# Patient Record
Sex: FEMALE | Race: WHITE | NOT HISPANIC OR LATINO | Employment: UNEMPLOYED | URBAN - METROPOLITAN AREA
[De-identification: names, ages, dates, MRNs, and addresses within clinical notes are randomized per-mention and may not be internally consistent; named-entity substitution may affect disease eponyms.]

---

## 2017-06-08 ENCOUNTER — HOSPITAL ENCOUNTER (EMERGENCY)
Facility: HOSPITAL | Age: 47
Discharge: HOME | End: 2017-06-08
Attending: EMERGENCY MEDICINE | Admitting: EMERGENCY MEDICINE

## 2017-06-08 VITALS
HEART RATE: 86 BPM | RESPIRATION RATE: 18 BRPM | TEMPERATURE: 98 F | WEIGHT: 215 LBS | BODY MASS INDEX: 40.59 KG/M2 | OXYGEN SATURATION: 97 % | DIASTOLIC BLOOD PRESSURE: 69 MMHG | HEIGHT: 61 IN | SYSTOLIC BLOOD PRESSURE: 138 MMHG

## 2017-06-08 DIAGNOSIS — Z76.0 MEDICATION REFILL: ICD-10-CM

## 2017-06-08 DIAGNOSIS — K29.00 ACUTE GASTRITIS WITHOUT HEMORRHAGE, UNSPECIFIED GASTRITIS TYPE: Primary | ICD-10-CM

## 2017-06-08 LAB
ANION GAP SERPL CALC-SCNC: 8 MMOL/L
B-HCG UR QL: NEGATIVE
BASOPHILS # BLD AUTO: 0.03 K/UL
BASOPHILS NFR BLD: 0.4 %
BUN SERPL-MCNC: 11 MG/DL
CALCIUM SERPL-MCNC: 9.1 MG/DL
CHLORIDE SERPL-SCNC: 101 MMOL/L
CO2 SERPL-SCNC: 27 MMOL/L
CREAT SERPL-MCNC: 0.8 MG/DL
CTP QC/QA: YES
DIFFERENTIAL METHOD: ABNORMAL
EOSINOPHIL # BLD AUTO: 0.1 K/UL
EOSINOPHIL NFR BLD: 0.8 %
ERYTHROCYTE [DISTWIDTH] IN BLOOD BY AUTOMATED COUNT: 12.5 %
EST. GFR  (AFRICAN AMERICAN): >60 ML/MIN/1.73 M^2
EST. GFR  (NON AFRICAN AMERICAN): >60 ML/MIN/1.73 M^2
GLUCOSE SERPL-MCNC: 113 MG/DL
HCT VFR BLD AUTO: 42.1 %
HGB BLD-MCNC: 14.4 G/DL
LYMPHOCYTES # BLD AUTO: 1.1 K/UL
LYMPHOCYTES NFR BLD: 14.9 %
MCH RBC QN AUTO: 32.1 PG
MCHC RBC AUTO-ENTMCNC: 34.2 %
MCV RBC AUTO: 94 FL
MONOCYTES # BLD AUTO: 0.5 K/UL
MONOCYTES NFR BLD: 7.3 %
NEUTROPHILS # BLD AUTO: 5.4 K/UL
NEUTROPHILS NFR BLD: 76.2 %
PLATELET # BLD AUTO: 264 K/UL
PMV BLD AUTO: 10.4 FL
POTASSIUM SERPL-SCNC: 4.1 MMOL/L
RBC # BLD AUTO: 4.49 M/UL
SODIUM SERPL-SCNC: 136 MMOL/L
WBC # BLD AUTO: 7.12 K/UL

## 2017-06-08 PROCEDURE — 99285 EMERGENCY DEPT VISIT HI MDM: CPT | Mod: ,,,

## 2017-06-08 PROCEDURE — 81025 URINE PREGNANCY TEST: CPT

## 2017-06-08 PROCEDURE — 96361 HYDRATE IV INFUSION ADD-ON: CPT

## 2017-06-08 PROCEDURE — 63600175 PHARM REV CODE 636 W HCPCS

## 2017-06-08 PROCEDURE — 96375 TX/PRO/DX INJ NEW DRUG ADDON: CPT

## 2017-06-08 PROCEDURE — 93010 ELECTROCARDIOGRAM REPORT: CPT | Mod: S$GLB,,, | Performed by: INTERNAL MEDICINE

## 2017-06-08 PROCEDURE — 93005 ELECTROCARDIOGRAM TRACING: CPT

## 2017-06-08 PROCEDURE — 99284 EMERGENCY DEPT VISIT MOD MDM: CPT | Mod: 25

## 2017-06-08 PROCEDURE — 80048 BASIC METABOLIC PNL TOTAL CA: CPT

## 2017-06-08 PROCEDURE — 96374 THER/PROPH/DIAG INJ IV PUSH: CPT

## 2017-06-08 PROCEDURE — 25000003 PHARM REV CODE 250

## 2017-06-08 PROCEDURE — 85025 COMPLETE CBC W/AUTO DIFF WBC: CPT

## 2017-06-08 RX ORDER — ONDANSETRON 4 MG/1
4 TABLET, FILM COATED ORAL EVERY 6 HOURS
Qty: 12 TABLET | Refills: 0 | Status: SHIPPED | OUTPATIENT
Start: 2017-06-08 | End: 2018-03-05

## 2017-06-08 RX ORDER — ZIPRASIDONE HYDROCHLORIDE 80 MG/1
80 CAPSULE ORAL 2 TIMES DAILY WITH MEALS
Qty: 14 CAPSULE | Refills: 0 | Status: SHIPPED | OUTPATIENT
Start: 2017-06-08 | End: 2017-06-15

## 2017-06-08 RX ORDER — AMLODIPINE BESYLATE 2.5 MG/1
2.5 TABLET ORAL DAILY
COMMUNITY
End: 2018-02-28 | Stop reason: SDUPTHER

## 2017-06-08 RX ORDER — ONDANSETRON 2 MG/ML
4 INJECTION INTRAMUSCULAR; INTRAVENOUS
Status: COMPLETED | OUTPATIENT
Start: 2017-06-08 | End: 2017-06-08

## 2017-06-08 RX ORDER — KETOROLAC TROMETHAMINE 30 MG/ML
10 INJECTION, SOLUTION INTRAMUSCULAR; INTRAVENOUS
Status: COMPLETED | OUTPATIENT
Start: 2017-06-08 | End: 2017-06-08

## 2017-06-08 RX ADMIN — KETOROLAC TROMETHAMINE 10 MG: 30 INJECTION, SOLUTION INTRAMUSCULAR at 11:06

## 2017-06-08 RX ADMIN — ONDANSETRON 4 MG: 2 INJECTION INTRAMUSCULAR; INTRAVENOUS at 10:06

## 2017-06-08 RX ADMIN — SODIUM CHLORIDE 1000 ML: 0.9 INJECTION, SOLUTION INTRAVENOUS at 10:06

## 2017-06-08 NOTE — ED TRIAGE NOTES
"Pt c/o "withdrawal from geodon." Pt reports last took geodon x2 days ago. Pt c/o generalized body aches, sweating, nausea with approx x5 episodes of vomiting today, and dizziness. Denies chest pain or SOB at this time.   "

## 2017-06-08 NOTE — ED PROVIDER NOTES
Encounter Date: 2017    SCRIBE #1 NOTE: I, Isabel Jensen, am scribing for, and in the presence of,  Dr. Collier. I have scribed the following portions of the note - the EKG reading.       History     Chief Complaint   Patient presents with    Generalized Body Aches     states having withdrawals from Geodon     Review of patient's allergies indicates:   Allergen Reactions    Latex, natural rubber       46-year-old female with medical history of bipolar disorder, hypertension and polysubstance dependence presents the ED with Geodon withdrawals.  Patient states she has been nauseous and vomiting for the past 24 hours.  Last dose of Geodon was 2 days ago.  Patient also endorses body aches and fatigue. Patient denies sick contacts. Patient denies fever, chills, abdominal pain, chest pain, shortness of breath, syncope, weakness.          Past Medical History:   Diagnosis Date    Bipolar disorder     Depression     Headache     History of psychiatric care     History of psychiatric hospitalization     HTN (hypertension)     Hypertension     Sonja     Polysubstance dependence 2015    Suicide attempt     Therapy      Past Surgical History:   Procedure Laterality Date     SECTION      CHOLECYSTECTOMY      CYST REMOVAL      on ovary, laproscopic    SINUS SURGERY      TONSILLECTOMY       Family History   Problem Relation Age of Onset    Hypertension Mother     Diabetes Father      Social History   Substance Use Topics    Smoking status: Never Smoker    Smokeless tobacco: Never Used    Alcohol use No     Review of Systems   Constitutional: Positive for fatigue. Negative for chills, diaphoresis and fever.   HENT: Negative for congestion and sore throat.    Eyes: Negative for visual disturbance.   Respiratory: Negative for cough, shortness of breath and wheezing.    Cardiovascular: Negative for chest pain.   Gastrointestinal: Positive for nausea and vomiting. Negative for abdominal pain and  diarrhea.   Genitourinary: Negative for dysuria and hematuria.   Musculoskeletal: Positive for myalgias. Negative for back pain.   Skin: Negative for rash.   Neurological: Negative for syncope, weakness and headaches.   Psychiatric/Behavioral: The patient is not nervous/anxious.        Physical Exam     Initial Vitals [06/08/17 0905]   BP Pulse Resp Temp SpO2   (!) 176/86 100 17 99.1 °F (37.3 °C) 99 %     Physical Exam    Vitals reviewed.  Constitutional: She appears well-developed and well-nourished. She is not diaphoretic. No distress.   HENT:   Head: Normocephalic and atraumatic.   Nose: Nose normal.   Mouth/Throat: Oropharynx is clear and moist. No oropharyngeal exudate.   Eyes: Conjunctivae and EOM are normal. Pupils are equal, round, and reactive to light.   Neck: Normal range of motion. Neck supple. No thyromegaly present.   Cardiovascular: Regular rhythm and intact distal pulses. Tachycardia present.    No murmur heard.  Pulmonary/Chest: Breath sounds normal. No respiratory distress. She has no wheezes. She has no rhonchi. She has no rales. She exhibits no tenderness.   Abdominal: Soft. Bowel sounds are normal. There is no rebound.   Musculoskeletal: She exhibits no edema.   Neurological: She is alert and oriented to person, place, and time. She has normal strength. No cranial nerve deficit or sensory deficit.   Skin: Skin is warm. Capillary refill takes less than 2 seconds. No rash noted.   Psychiatric: She has a normal mood and affect.         ED Course   Procedures  Labs Reviewed   CBC W/ AUTO DIFFERENTIAL - Abnormal; Notable for the following:        Result Value    MCH 32.1 (*)     Gran% 76.2 (*)     Lymph% 14.9 (*)     All other components within normal limits   BASIC METABOLIC PANEL - Abnormal; Notable for the following:     Glucose 113 (*)     All other components within normal limits   POCT URINE PREGNANCY     EKG Readings: (Independently Interpreted)   Initial Reading: No STEMI. Rhythm: Normal  Sinus Rhythm. Heart Rate: 93.          Medical Decision Making:   History:   Old Medical Records: I decided to obtain old medical records.  Old Records Summarized: records from clinic visits.  Independently Interpreted Test(s):   I have ordered and independently interpreted EKG Reading(s) - see prior notes  Clinical Tests:   Lab Tests: Ordered and Reviewed  Medical Tests: Ordered and Reviewed       APC / Resident Notes:   46-year-old female with medical history of bipolar disorder, hypertension and polysubstance dependence presents the ED with Geodon withdrawals.  Cardiac exam reveals regular rate and rhythm.  Lungs are clear bilaterally on auscultation with no decreased breath sounds.  Abdomen is non tender, non distended with normal bowel sounds x 4.  mucous membranes dry.  Strength intact.  Sensation intact.  Distal pulses intact.  Vitals are stable.  Patient is in no acute distress.    Patient given IV toradol 10mg,IV zofran 4mgand IVF.    Labs reviewed.   UPT negative. CBC wnl. CMP wnl.    10:46 AM  Psych contacted. Said ok to give week worth of Geodon 80mg BID and will have Dr. Abrams move up patient's appointment. Given number [phone] and to talk with Kristi    Discharged to home in stable condition, return to ED warnings given, follow up and patient care instructions given. Patient discharged home with zofran 4mg and Geodon 80mg BID.      I have discussed and reviewed with my supervising physician.           [place] Attestation:   Scribe #1: I performed the above scribed service and the documentation accurately describes the services I performed. I attest to the accuracy of the note.    Attending Attestation:     Physician Attestation Statement for NP/PA:   I discussed this assessment and plan of this patient with the NP/PA, but I did not personally examine the patient. The face to face encounter was performed by the NP/PA.        Physician Attestation for Scribe:  Physician Attestation Statement for [place]  #1: I, Dr. Collier, reviewed documentation, as scribed by Isabel Jensen in my presence, and it is both accurate and complete.                 ED Course     Clinical Impression:   The primary encounter diagnosis was Acute gastritis without hemorrhage, unspecified gastritis type. A diagnosis of Medication refill was also pertinent to this visit.    Disposition:   Disposition: Discharged  Condition: Stable       Gretta Charles PA-C  06/08/17 1820       Juan Hollis MD  06/21/17 0100

## 2017-06-29 ENCOUNTER — HOSPITAL ENCOUNTER (EMERGENCY)
Facility: HOSPITAL | Age: 47
Discharge: HOME | End: 2017-06-29
Attending: EMERGENCY MEDICINE | Admitting: EMERGENCY MEDICINE

## 2017-06-29 VITALS
HEIGHT: 61 IN | DIASTOLIC BLOOD PRESSURE: 72 MMHG | OXYGEN SATURATION: 95 % | BODY MASS INDEX: 40.59 KG/M2 | SYSTOLIC BLOOD PRESSURE: 137 MMHG | RESPIRATION RATE: 18 BRPM | TEMPERATURE: 99 F | HEART RATE: 82 BPM | WEIGHT: 215 LBS

## 2017-06-29 DIAGNOSIS — N39.0 URINARY TRACT INFECTION WITHOUT HEMATURIA, SITE UNSPECIFIED: Primary | ICD-10-CM

## 2017-06-29 LAB
ALBUMIN SERPL BCP-MCNC: 3.1 G/DL
ALP SERPL-CCNC: 126 U/L
ALT SERPL W/O P-5'-P-CCNC: 171 U/L
ANION GAP SERPL CALC-SCNC: 11 MMOL/L
AST SERPL-CCNC: 100 U/L
B-HCG UR QL: NEGATIVE
BACTERIA #/AREA URNS AUTO: ABNORMAL /HPF
BASOPHILS # BLD AUTO: 0.03 K/UL
BASOPHILS NFR BLD: 0.5 %
BILIRUB SERPL-MCNC: 0.2 MG/DL
BILIRUB UR QL STRIP: NEGATIVE
BUN SERPL-MCNC: 7 MG/DL
CALCIUM SERPL-MCNC: 9 MG/DL
CHLORIDE SERPL-SCNC: 107 MMOL/L
CLARITY UR REFRACT.AUTO: ABNORMAL
CO2 SERPL-SCNC: 25 MMOL/L
COLOR UR AUTO: YELLOW
CREAT SERPL-MCNC: 0.9 MG/DL
DIFFERENTIAL METHOD: ABNORMAL
EOSINOPHIL # BLD AUTO: 0.2 K/UL
EOSINOPHIL NFR BLD: 3.2 %
ERYTHROCYTE [DISTWIDTH] IN BLOOD BY AUTOMATED COUNT: 12.7 %
EST. GFR  (AFRICAN AMERICAN): >60 ML/MIN/1.73 M^2
EST. GFR  (NON AFRICAN AMERICAN): >60 ML/MIN/1.73 M^2
GLUCOSE SERPL-MCNC: 125 MG/DL
GLUCOSE UR QL STRIP: NEGATIVE
HCT VFR BLD AUTO: 38.1 %
HGB BLD-MCNC: 12.8 G/DL
HGB UR QL STRIP: NEGATIVE
KETONES UR QL STRIP: NEGATIVE
LEUKOCYTE ESTERASE UR QL STRIP: ABNORMAL
LYMPHOCYTES # BLD AUTO: 1.8 K/UL
LYMPHOCYTES NFR BLD: 27.7 %
MCH RBC QN AUTO: 31.3 PG
MCHC RBC AUTO-ENTMCNC: 33.6 %
MCV RBC AUTO: 93 FL
MICROSCOPIC COMMENT: ABNORMAL
MONOCYTES # BLD AUTO: 0.8 K/UL
MONOCYTES NFR BLD: 11.4 %
NEUTROPHILS # BLD AUTO: 3.8 K/UL
NEUTROPHILS NFR BLD: 56.9 %
NITRITE UR QL STRIP: NEGATIVE
PH UR STRIP: 6 [PH] (ref 5–8)
PLATELET # BLD AUTO: 217 K/UL
PMV BLD AUTO: 10.8 FL
POTASSIUM SERPL-SCNC: 4 MMOL/L
PROT SERPL-MCNC: 6.5 G/DL
PROT UR QL STRIP: NEGATIVE
RBC # BLD AUTO: 4.09 M/UL
RBC #/AREA URNS AUTO: 0 /HPF (ref 0–4)
SODIUM SERPL-SCNC: 143 MMOL/L
SP GR UR STRIP: 1.01 (ref 1–1.03)
SQUAMOUS #/AREA URNS AUTO: 10 /HPF
URN SPEC COLLECT METH UR: ABNORMAL
UROBILINOGEN UR STRIP-ACNC: NEGATIVE EU/DL
WBC # BLD AUTO: 6.64 K/UL
WBC #/AREA URNS AUTO: 12 /HPF (ref 0–5)

## 2017-06-29 PROCEDURE — 80053 COMPREHEN METABOLIC PANEL: CPT

## 2017-06-29 PROCEDURE — 81025 URINE PREGNANCY TEST: CPT

## 2017-06-29 PROCEDURE — 87186 SC STD MICRODIL/AGAR DIL: CPT

## 2017-06-29 PROCEDURE — 99284 EMERGENCY DEPT VISIT MOD MDM: CPT | Mod: 25

## 2017-06-29 PROCEDURE — 87077 CULTURE AEROBIC IDENTIFY: CPT

## 2017-06-29 PROCEDURE — 87088 URINE BACTERIA CULTURE: CPT

## 2017-06-29 PROCEDURE — 81003 URINALYSIS AUTO W/O SCOPE: CPT

## 2017-06-29 PROCEDURE — 81001 URINALYSIS AUTO W/SCOPE: CPT

## 2017-06-29 PROCEDURE — 85025 COMPLETE CBC W/AUTO DIFF WBC: CPT

## 2017-06-29 PROCEDURE — 96375 TX/PRO/DX INJ NEW DRUG ADDON: CPT

## 2017-06-29 PROCEDURE — 99284 EMERGENCY DEPT VISIT MOD MDM: CPT | Mod: ,,, | Performed by: PHYSICIAN ASSISTANT

## 2017-06-29 PROCEDURE — 87086 URINE CULTURE/COLONY COUNT: CPT

## 2017-06-29 PROCEDURE — 96361 HYDRATE IV INFUSION ADD-ON: CPT

## 2017-06-29 PROCEDURE — 63600175 PHARM REV CODE 636 W HCPCS: Performed by: PHYSICIAN ASSISTANT

## 2017-06-29 PROCEDURE — 96374 THER/PROPH/DIAG INJ IV PUSH: CPT

## 2017-06-29 PROCEDURE — 25000003 PHARM REV CODE 250: Performed by: PHYSICIAN ASSISTANT

## 2017-06-29 RX ORDER — METOCLOPRAMIDE HYDROCHLORIDE 5 MG/ML
10 INJECTION INTRAMUSCULAR; INTRAVENOUS
Status: DISCONTINUED | OUTPATIENT
Start: 2017-06-29 | End: 2017-06-29

## 2017-06-29 RX ORDER — ONDANSETRON 2 MG/ML
4 INJECTION INTRAMUSCULAR; INTRAVENOUS
Status: COMPLETED | OUTPATIENT
Start: 2017-06-29 | End: 2017-06-29

## 2017-06-29 RX ORDER — PHENAZOPYRIDINE HYDROCHLORIDE 200 MG/1
200 TABLET, FILM COATED ORAL 3 TIMES DAILY
Qty: 6 TABLET | Refills: 0 | Status: SHIPPED | OUTPATIENT
Start: 2017-06-29 | End: 2017-07-09

## 2017-06-29 RX ORDER — SULFAMETHOXAZOLE AND TRIMETHOPRIM 800; 160 MG/1; MG/1
1 TABLET ORAL 2 TIMES DAILY
Qty: 14 TABLET | Refills: 0 | Status: SHIPPED | OUTPATIENT
Start: 2017-06-29 | End: 2017-07-06

## 2017-06-29 RX ORDER — KETOROLAC TROMETHAMINE 30 MG/ML
10 INJECTION, SOLUTION INTRAMUSCULAR; INTRAVENOUS
Status: COMPLETED | OUTPATIENT
Start: 2017-06-29 | End: 2017-06-29

## 2017-06-29 RX ORDER — MORPHINE SULFATE 2 MG/ML
8 INJECTION, SOLUTION INTRAMUSCULAR; INTRAVENOUS
Status: COMPLETED | OUTPATIENT
Start: 2017-06-29 | End: 2017-06-29

## 2017-06-29 RX ADMIN — KETOROLAC TROMETHAMINE 10 MG: 30 INJECTION, SOLUTION INTRAMUSCULAR at 03:06

## 2017-06-29 RX ADMIN — MORPHINE SULFATE 8 MG: 2 INJECTION, SOLUTION INTRAMUSCULAR; INTRAVENOUS at 03:06

## 2017-06-29 RX ADMIN — SODIUM CHLORIDE 1000 ML: 0.9 INJECTION, SOLUTION INTRAVENOUS at 03:06

## 2017-06-29 RX ADMIN — ONDANSETRON 4 MG: 2 INJECTION INTRAMUSCULAR; INTRAVENOUS at 03:06

## 2017-06-29 NOTE — DISCHARGE INSTRUCTIONS
Take all antibiotics until completed  Drink plenty of fluids  Follow up with your primary care provider  Return to the ER for any new symptoms

## 2017-06-29 NOTE — ED NOTES
Two patient identifiers have been checked and are correct.      Appearance: Pt awake, alert & oriented to person, place & time. Pt in no acute distress at present time. Pt is clean and well groomed with clothes appropriately fastened.   Skin: Skin warm, dry & intact. Color consistent with ethnicity. Mucous membranes moist. No breakdown or brusing noted.   Musculoskeletal: Patient moving all extremities well, no obvious swelling or deformities noted. Reports right sided flank pain x 2 days. Pt reports similar feeling to other kidney stones.   Respiratory: Respirations spontaneous, even, and non-labored. Visible chest rise noted. Airway is open and patent. No accessory muscle use noted. Denies SOB.  Neurologic: Sensation is intact. Speech is clear and appropriate. Eyes open spontaneously, behavior appropriate to situation, follows commands, facial expression symmetrical, bilateral hand grasp equal and even, purposeful motor response noted. Denies HA.  Cardiac: All peripheral pulses present. No Bilateral lower extremity edema. Cap refill is <3 seconds.  Abdomen: Abdomen soft, non-tender to palpation. Denies NVD.  : Pt reports no dysuria or hematuria. Reports frequency and hesitancy. Hx of kidney stones.

## 2017-06-29 NOTE — ED TRIAGE NOTES
Shilohclemencia Chatterjee, a 46 y.o. female presents to the ED       Chief Complaint   Patient presents with    FLANK PAIN     Pt reports right sided flank pain, urinary frequency and dysuria. hx of kidney stones.     Review of patient's allergies indicates:   Allergen Reactions    Latex, natural rubber      Past Medical History:   Diagnosis Date    Bipolar disorder     Depression     Headache     History of psychiatric care     History of psychiatric hospitalization     HTN (hypertension)     Hypertension     Sonja     Polysubstance dependence 7/2/2015    Suicide attempt     Therapy

## 2017-06-29 NOTE — ED PROVIDER NOTES
Encounter Date: 2017    SCRIBE #1 NOTE: I, Krish Valverde, am scribing for, and in the presence of,  Prasad Wood MD. I have scribed the following portions of the note - the APC attestation.       History     Chief Complaint   Patient presents with    FLANK PAIN     Pt reports right sided flank pain, urinary frequency and dysuria. hx of kidney stones.     46-year-old female presents to the ER for evaluation of right-sided flank pain.  Pain started acutely a few hours ago.  She endorses nausea with emesis.  She has had this pain before, with past kidney stones, not for greater than 7 years.  She denies any fever, chills, chest pain, shortness of breath.  She denies any trauma or injury.          Review of patient's allergies indicates:   Allergen Reactions    Latex, natural rubber      Past Medical History:   Diagnosis Date    Bipolar disorder     Depression     Headache     History of psychiatric care     History of psychiatric hospitalization     HTN (hypertension)     Hypertension     Sonja     Polysubstance dependence 2015    Suicide attempt     Therapy      Past Surgical History:   Procedure Laterality Date     SECTION      CHOLECYSTECTOMY      CYST REMOVAL      on ovary, laproscopic    SINUS SURGERY      TONSILLECTOMY       Family History   Problem Relation Age of Onset    Hypertension Mother     Diabetes Father      Social History   Substance Use Topics    Smoking status: Never Smoker    Smokeless tobacco: Never Used    Alcohol use No     Review of Systems   Constitutional: Negative for fever.   HENT: Negative for sore throat.    Respiratory: Negative for shortness of breath.    Cardiovascular: Negative for chest pain.   Gastrointestinal: Positive for abdominal pain, nausea and vomiting.   Genitourinary: Positive for dysuria, flank pain and hematuria.   Musculoskeletal: Negative for back pain.   Skin: Negative for rash.   Neurological: Negative for weakness.    Hematological: Does not bruise/bleed easily.       Physical Exam     Initial Vitals [06/29/17 0239]   BP Pulse Resp Temp SpO2   (!) 168/73 94 18 99.2 °F (37.3 °C) 96 %      MAP       104.67         Physical Exam    Constitutional: Vital signs are normal. She appears well-developed and well-nourished. She is not diaphoretic. No distress.   HENT:   Head: Normocephalic and atraumatic.   Right Ear: External ear normal.   Left Ear: External ear normal.   Eyes: Conjunctivae and EOM are normal. Right eye exhibits no discharge. Left eye exhibits no discharge.   Cardiovascular: Normal rate, regular rhythm and normal heart sounds. Exam reveals no gallop and no friction rub.    No murmur heard.  Abdominal: Soft. Normal appearance, normal aorta and bowel sounds are normal. She exhibits no distension and no mass. There is tenderness. There is no rebound and no guarding.   R sided CVA pain and flank pain on exam  Otherwise normal exam   Musculoskeletal: Normal range of motion.   Neurological: She is alert and oriented to person, place, and time.   Skin: Skin is warm and intact.   Psychiatric: She has a normal mood and affect. Her speech is normal and behavior is normal. Cognition and memory are normal.         ED Course   Procedures  Labs Reviewed   CBC W/ AUTO DIFFERENTIAL - Abnormal; Notable for the following:        Result Value    MCH 31.3 (*)     All other components within normal limits   COMPREHENSIVE METABOLIC PANEL - Abnormal; Notable for the following:     Glucose 125 (*)     Albumin 3.1 (*)      (*)      (*)     All other components within normal limits   URINALYSIS, REFLEX TO URINE CULTURE - Abnormal; Notable for the following:     Appearance, UA Hazy (*)     Leukocytes, UA Trace (*)     All other components within normal limits    Narrative:     ADD ON URINE PREGNANCY PER DR JOY JIANG/ORDER# [id number] @ 3:53AM   6/29/17  Preferred Collection Type->Urine, Clean Catch   URINALYSIS MICROSCOPIC -  Abnormal; Notable for the following:     WBC, UA 12 (*)     Bacteria, UA Moderate (*)     All other components within normal limits    Narrative:     ADD ON URINE PREGNANCY PER DR PRASAD WOOD/ORDER# [id number] @ 3:53AM   6/29/17  Preferred Collection Type->Urine, Clean Catch   CULTURE, URINE   PREGNANCY TEST, URINE RAPID   PREGNANCY TEST, URINE RAPID    Narrative:     ADD ON URINE PREGNANCY PER DR PRASAD WOOD/ORDER# [id number] @ 3:53AM   6/29/17  Preferred Collection Type->Urine, Clean Catch             Medical Decision Making:   History:   Old Medical Records: I decided to obtain old medical records.  Independently Interpreted Test(s):   I have ordered and independently interpreted X-rays - see summary below.       <> Summary of X-Ray Reading(s): CT renal: nad  Clinical Tests:   Lab Tests: Ordered and Reviewed  Radiological Study: Ordered  ED Management:  46-year-old female with right flank pain.  CT scan reveals a large amount of stool.  Urinalysis reveals moderate amount of bacteria with a few white blood cells.  Given this along with patient's symptoms I will treat her for acute cystitis and discharge home. Advised that she start using OTC miralax for a few days  Other:   I discussed test(s) with the performing physician.       <> Summary of the Findings: CT renal: nad            Scribe Attestation:   Scribe #1: I performed the above scribed service and the documentation accurately describes the services I performed. I attest to the accuracy of the note.    Attending Attestation:     Physician Attestation Statement for NP/PA:   I discussed this assessment and plan of this patient with the NP/PA, but I did not personally examine the patient. The face to face encounter was performed by the NP/PA.    Other NP/PA Attestation Additions:    History of Present Illness: Flank pain         Physician Attestation for Scribe:  Physician Attestation Statement for Scribe #1: I, Prasad Wood MD, reviewed documentation, as  scribed by Krish Valverde in my presence, and it is both accurate and complete.                 ED Course     Clinical Impression:   The encounter diagnosis was Urinary tract infection without hematuria, site unspecified.                           Moise Mcdaniel PA-C  06/29/17 6398       Prasad Wood III, MD  06/29/17 6410

## 2017-07-01 LAB — BACTERIA UR CULT: NORMAL

## 2017-12-26 ENCOUNTER — HOSPITAL ENCOUNTER (EMERGENCY)
Facility: HOSPITAL | Age: 47
Discharge: STILL ADMITTED OR ADMINISTRATIVE | End: 2017-12-26
Attending: EMERGENCY MEDICINE

## 2017-12-26 VITALS
RESPIRATION RATE: 16 BRPM | OXYGEN SATURATION: 96 % | BODY MASS INDEX: 40.59 KG/M2 | TEMPERATURE: 99 F | WEIGHT: 215 LBS | SYSTOLIC BLOOD PRESSURE: 168 MMHG | HEART RATE: 85 BPM | DIASTOLIC BLOOD PRESSURE: 85 MMHG | HEIGHT: 61 IN

## 2017-12-26 DIAGNOSIS — Z53.21 PATIENT LEFT WITHOUT BEING SEEN: ICD-10-CM

## 2017-12-26 PROCEDURE — 93005 ELECTROCARDIOGRAM TRACING: CPT

## 2017-12-26 PROCEDURE — 99900041 HC LEFT WITHOUT BEING SEEN- EMERGENCY: Mod: 25

## 2017-12-26 PROCEDURE — 99900 PR LEFT WITHOUTBEING SEEN-EMERGENCY: CPT | Mod: ,,, | Performed by: EMERGENCY MEDICINE

## 2017-12-26 PROCEDURE — 93010 ELECTROCARDIOGRAM REPORT: CPT | Mod: ,,, | Performed by: INTERNAL MEDICINE

## 2018-01-08 ENCOUNTER — HOSPITAL ENCOUNTER (EMERGENCY)
Facility: HOSPITAL | Age: 48
Discharge: HOME | End: 2018-01-08
Attending: EMERGENCY MEDICINE
Payer: COMMERCIAL

## 2018-01-08 VITALS
SYSTOLIC BLOOD PRESSURE: 188 MMHG | RESPIRATION RATE: 18 BRPM | BODY MASS INDEX: 40.59 KG/M2 | HEIGHT: 61 IN | HEART RATE: 88 BPM | DIASTOLIC BLOOD PRESSURE: 106 MMHG | OXYGEN SATURATION: 98 % | WEIGHT: 215 LBS | TEMPERATURE: 98 F

## 2018-01-08 DIAGNOSIS — R68.89 MULTIPLE COMPLAINTS: Primary | ICD-10-CM

## 2018-01-08 DIAGNOSIS — R55 SYNCOPE: ICD-10-CM

## 2018-01-08 DIAGNOSIS — R06.02 SOB (SHORTNESS OF BREATH): ICD-10-CM

## 2018-01-08 LAB
ALBUMIN SERPL BCP-MCNC: 4 G/DL
ALP SERPL-CCNC: 122 U/L
ALT SERPL W/O P-5'-P-CCNC: 63 U/L
ANION GAP SERPL CALC-SCNC: 8 MMOL/L
AST SERPL-CCNC: 34 U/L
BASOPHILS # BLD AUTO: 0.03 K/UL
BASOPHILS NFR BLD: 0.3 %
BILIRUB SERPL-MCNC: 0.5 MG/DL
BUN SERPL-MCNC: 9 MG/DL
CALCIUM SERPL-MCNC: 10 MG/DL
CHLORIDE SERPL-SCNC: 99 MMOL/L
CO2 SERPL-SCNC: 31 MMOL/L
CREAT SERPL-MCNC: 0.8 MG/DL
D DIMER PPP IA.FEU-MCNC: 0.3 MG/L FEU
DIFFERENTIAL METHOD: ABNORMAL
EOSINOPHIL # BLD AUTO: 0.1 K/UL
EOSINOPHIL NFR BLD: 0.6 %
ERYTHROCYTE [DISTWIDTH] IN BLOOD BY AUTOMATED COUNT: 12.8 %
EST. GFR  (AFRICAN AMERICAN): >60 ML/MIN/1.73 M^2
EST. GFR  (NON AFRICAN AMERICAN): >60 ML/MIN/1.73 M^2
GLUCOSE SERPL-MCNC: 93 MG/DL
HCT VFR BLD AUTO: 43.2 %
HGB BLD-MCNC: 14.7 G/DL
IMM GRANULOCYTES # BLD AUTO: 0.03 K/UL
IMM GRANULOCYTES NFR BLD AUTO: 0.3 %
LYMPHOCYTES # BLD AUTO: 1.6 K/UL
LYMPHOCYTES NFR BLD: 16.7 %
MCH RBC QN AUTO: 29.9 PG
MCHC RBC AUTO-ENTMCNC: 34 G/DL
MCV RBC AUTO: 88 FL
MONOCYTES # BLD AUTO: 0.5 K/UL
MONOCYTES NFR BLD: 5.5 %
NEUTROPHILS # BLD AUTO: 7.4 K/UL
NEUTROPHILS NFR BLD: 76.6 %
NRBC BLD-RTO: 0 /100 WBC
PLATELET # BLD AUTO: 273 K/UL
PMV BLD AUTO: 10.1 FL
POTASSIUM SERPL-SCNC: 4.3 MMOL/L
PROT SERPL-MCNC: 8.4 G/DL
RBC # BLD AUTO: 4.91 M/UL
SODIUM SERPL-SCNC: 138 MMOL/L
TROPONIN I SERPL DL<=0.01 NG/ML-MCNC: <0.006 NG/ML
WBC # BLD AUTO: 9.66 K/UL

## 2018-01-08 PROCEDURE — 93005 ELECTROCARDIOGRAM TRACING: CPT

## 2018-01-08 PROCEDURE — 93010 ELECTROCARDIOGRAM REPORT: CPT | Mod: ,,, | Performed by: INTERNAL MEDICINE

## 2018-01-08 PROCEDURE — 84484 ASSAY OF TROPONIN QUANT: CPT

## 2018-01-08 PROCEDURE — 80053 COMPREHEN METABOLIC PANEL: CPT

## 2018-01-08 PROCEDURE — 85379 FIBRIN DEGRADATION QUANT: CPT

## 2018-01-08 PROCEDURE — 99284 EMERGENCY DEPT VISIT MOD MDM: CPT | Mod: 25

## 2018-01-08 PROCEDURE — 99284 EMERGENCY DEPT VISIT MOD MDM: CPT | Mod: ,,, | Performed by: EMERGENCY MEDICINE

## 2018-01-08 PROCEDURE — 85025 COMPLETE CBC W/AUTO DIFF WBC: CPT

## 2018-01-08 PROCEDURE — 96360 HYDRATION IV INFUSION INIT: CPT

## 2018-01-08 PROCEDURE — 25000003 PHARM REV CODE 250: Performed by: EMERGENCY MEDICINE

## 2018-01-08 RX ORDER — LEVETIRACETAM 250 MG/1
500 TABLET ORAL 2 TIMES DAILY
COMMUNITY
End: 2018-02-28

## 2018-01-08 RX ADMIN — SODIUM CHLORIDE 1000 ML: 0.9 INJECTION, SOLUTION INTRAVENOUS at 01:01

## 2018-01-08 NOTE — ED PROVIDER NOTES
Encounter Date: 1/8/2018    SCRIBE #1 NOTE: I, Dana Alfaro, am scribing for, and in the presence of, Dr. Cheng.       History     Chief Complaint   Patient presents with    Loss of Consciousness     been passing out this morning    Back Pain     having lower back pain and going down L leg,    Shortness of Breath     epigastric pain,     Time patient was seen by the provider: 12:43 PM      Francisco is a 47 y.o. female with co-morbidities including HTN, headache, bipolar disorder, suicide attempt and depression who presents to the ED with multiple complaints. Patient reports chronic low back pain, worsened x a few months, making it very difficult and painful to walk. She was previously on Tramadol, but has not been on medication for a year, and reports that OTC medications do not work. Yesterday, she had chills, diaphoresis, dizziness and an episode of syncope that lasted approximately 4-5 minutes. Patient states she has a history of seizures, on Keppra and Geodon, and the syncopal episode this morning did not feel like a seizure. She additionally complains of intermittent chest pain, shortness of breath with epigastric abdominal pain when she breathes x 6 months. She lastly reports occasional fecal incontinence. This occurs about once a month, and her last episode was one week ago. The patient states that none of the symptoms she is complaining about are new, but she just got insurance, so she wanted to come to the ED to get checked out. She has an appointment with her new PCP on Friday.      The history is provided by the patient and medical records.     Review of patient's allergies indicates:   Allergen Reactions    Latex, natural rubber      Past Medical History:   Diagnosis Date    Bipolar disorder     Depression     Headache(784.0)     History of psychiatric care     History of psychiatric hospitalization     HTN (hypertension)     Hypertension     Sonja     Polysubstance dependence 7/2/2015     Suicide attempt     Therapy      Past Surgical History:   Procedure Laterality Date     SECTION      CHOLECYSTECTOMY      CYST REMOVAL      on ovary, laproscopic    SINUS SURGERY      TONSILLECTOMY       Family History   Problem Relation Age of Onset    Hypertension Mother     Diabetes Father      Social History   Substance Use Topics    Smoking status: Never Smoker    Smokeless tobacco: Never Used    Alcohol use No     Review of Systems   Constitutional: Positive for chills and diaphoresis. Negative for fever.   HENT: Negative for nosebleeds.    Eyes: Negative for photophobia and visual disturbance.   Respiratory: Positive for shortness of breath. Negative for cough.    Cardiovascular: Positive for chest pain. Negative for palpitations.   Gastrointestinal: Positive for abdominal pain (epigastric). Negative for diarrhea, nausea and vomiting.        Positive for occasional fecal incontinence.    Genitourinary: Negative for difficulty urinating, dysuria and hematuria.   Musculoskeletal: Positive for back pain (chronic, low back).   Skin: Negative for rash and wound.   Neurological: Positive for dizziness and syncope.       Physical Exam     Initial Vitals [18 1003]   BP Pulse Resp Temp SpO2   (!) 188/106 88 18 98.3 °F (36.8 °C) 98 %      MAP       133.33         Physical Exam    Nursing note and vitals reviewed.  Constitutional: She appears well-developed and well-nourished. She is diaphoretic (mildly). No distress.   HENT:   Head: Normocephalic and atraumatic.   Eyes: Conjunctivae are normal.   Neck: Normal range of motion.   Cardiovascular: Normal rate, regular rhythm, normal heart sounds and intact distal pulses.   Pulmonary/Chest: Breath sounds normal. No respiratory distress. She has no wheezes. She has no rhonchi. She has no rales.   Abdominal: Soft. There is no tenderness.   Musculoskeletal: Normal range of motion.   Midline lower back pain.    Neurological: She is alert and oriented to  person, place, and time. She has normal strength. No cranial nerve deficit or sensory deficit. Gait normal.   Normal strength and sensation of bilateral lower extremities.   Skin: No rash noted. No erythema.         ED Course   Procedures  Labs Reviewed   CBC W/ AUTO DIFFERENTIAL - Abnormal; Notable for the following:        Result Value    Gran% 76.6 (*)     Lymph% 16.7 (*)     All other components within normal limits   COMPREHENSIVE METABOLIC PANEL - Abnormal; Notable for the following:     CO2 31 (*)     ALT 63 (*)     All other components within normal limits   D DIMER, QUANTITATIVE   TROPONIN I             Medical Decision Making:   History:   Old Medical Records: I decided to obtain old medical records.  Old Records Summarized: other records.       <> Summary of Records: Hx of HTN, headache, bipolar disorder, suicide attempt, depression.   Initial Assessment:   Patient with a hx of HTN, headache, bipolar disorder, suicide attempt and depression, presenting with months of intermittent chest pain, shortness of breath. Patient had an episode of syncope yesterday, preceded by vagal prodrome and shortness of breath. Exam is unremarkable.  Differential Diagnosis:   My initial differential diagnosis includes but is not limited to PE, ACS, vagal syncope, muscular chest pain.  Clinical Tests:   Lab Tests: Ordered and Reviewed  Radiological Study: Ordered and Reviewed  Medical Tests: Ordered and Reviewed  ED Management:  Most of patient's symptoms are chronic, however, patient never sought any medical attention. Will do workup including ACS workup, D-dimer, CXR. If all are negative, I feel patient can be follow up in the outpatient setting for workup of chronic symptoms. Patient also states she's been having chronic low back pain and months of episodes of fecal incontinence, her last episode being a week ago. Good neurological exam of lower extremities. In view of chronicity of symptoms, I do not feel emergent need for  MRI, and I told her to follow up with PCP for outpatient MRI to rule out chronic cauda equina.             Scribe Attestation:   Scribe #1: I performed the above scribed service and the documentation accurately describes the services I performed. I attest to the accuracy of the note.  Comments: I, Dr. Sara Cheng, personally performed the services described in this documentation. All medical record entries made by the scribe were at my direction and in my presence.  I have reviewed the chart and agree that the record reflects my personal performance and is accurate and complete. Sara Cheng MD.  10:03 AM 01/09/2018      Attending Attestation:             Attending ED Notes:   2:38 PM Labs unremarkable, D-dimer negative. Patient states that yesterday before her syncopal episode, symptoms were similar to a panic attack. Patient also qualifies shortness of breath is more like feeling like her chest is tight. In view of normal workup, I do not feel any further workup for PE is needed. I advised her to follow up with PCP and return instructions given.           ED Course      Clinical Impression:   The primary encounter diagnosis was Multiple complaints. Diagnoses of Syncope and SOB (shortness of breath) were also pertinent to this visit.    Disposition:   Disposition: Discharged  Condition: Stable                        Sara Cheng MD  01/09/18 100

## 2018-01-08 NOTE — ED TRIAGE NOTES
Lower medial extremity tingling, lower back pain with bowel incontinence once a month for several months, chest pain, syncope yesterday, chest pain chronic, shortness of breath for several months.  States she just became insured so now she is seeking care for all problems.

## 2018-02-28 ENCOUNTER — TELEPHONE (OUTPATIENT)
Dept: INTERNAL MEDICINE | Facility: CLINIC | Age: 48
End: 2018-02-28

## 2018-02-28 ENCOUNTER — OFFICE VISIT (OUTPATIENT)
Dept: INTERNAL MEDICINE | Facility: CLINIC | Age: 48
End: 2018-02-28
Payer: COMMERCIAL

## 2018-02-28 ENCOUNTER — HOSPITAL ENCOUNTER (OUTPATIENT)
Dept: RADIOLOGY | Facility: HOSPITAL | Age: 48
Discharge: HOME | End: 2018-02-28
Attending: NURSE PRACTITIONER
Payer: COMMERCIAL

## 2018-02-28 VITALS
BODY MASS INDEX: 46.62 KG/M2 | DIASTOLIC BLOOD PRESSURE: 94 MMHG | SYSTOLIC BLOOD PRESSURE: 158 MMHG | HEART RATE: 92 BPM | WEIGHT: 246.94 LBS | TEMPERATURE: 99 F | OXYGEN SATURATION: 97 % | HEIGHT: 61 IN

## 2018-02-28 DIAGNOSIS — I10 HYPERTENSION, UNSPECIFIED TYPE: ICD-10-CM

## 2018-02-28 DIAGNOSIS — S96.912A STRAIN OF LEFT ANKLE, INITIAL ENCOUNTER: Primary | ICD-10-CM

## 2018-02-28 DIAGNOSIS — M79.89 PAIN AND SWELLING OF LEFT LOWER LEG: ICD-10-CM

## 2018-02-28 DIAGNOSIS — M79.662 PAIN AND SWELLING OF LEFT LOWER LEG: Primary | ICD-10-CM

## 2018-02-28 DIAGNOSIS — M79.662 PAIN AND SWELLING OF LEFT LOWER LEG: ICD-10-CM

## 2018-02-28 DIAGNOSIS — M79.89 PAIN AND SWELLING OF LEFT LOWER LEG: Primary | ICD-10-CM

## 2018-02-28 PROCEDURE — 99214 OFFICE O/P EST MOD 30 MIN: CPT | Mod: SA,S$GLB,, | Performed by: NURSE PRACTITIONER

## 2018-02-28 PROCEDURE — 93970 EXTREMITY STUDY: CPT | Mod: TC

## 2018-02-28 PROCEDURE — 93970 EXTREMITY STUDY: CPT | Mod: 26,,, | Performed by: RADIOLOGY

## 2018-02-28 PROCEDURE — 73630 X-RAY EXAM OF FOOT: CPT | Mod: TC,LT

## 2018-02-28 PROCEDURE — 99999 PR PBB SHADOW E&M-EST. PATIENT-LVL IV: CPT | Mod: PBBFAC,,, | Performed by: NURSE PRACTITIONER

## 2018-02-28 PROCEDURE — 73630 X-RAY EXAM OF FOOT: CPT | Mod: 26,LT,, | Performed by: RADIOLOGY

## 2018-02-28 RX ORDER — RISPERIDONE 1 MG/1
1 TABLET ORAL DAILY
COMMUNITY

## 2018-02-28 RX ORDER — AMLODIPINE BESYLATE 5 MG/1
5 TABLET ORAL DAILY
Qty: 90 TABLET | Refills: 0 | Status: SHIPPED | OUTPATIENT
Start: 2018-02-28 | End: 2018-10-25

## 2018-02-28 NOTE — TELEPHONE ENCOUNTER
----- Message from Dax Koenig sent at 2/28/2018  3:25 PM CST -----  Contact: Patient [phone]  Patient would like to get test results.  Name of test (lab, mammo, etc.):  Xray and ultra soune  Date of test:  2/28/18  Ordering provider: Sara Kunz
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
## 2018-03-01 ENCOUNTER — TELEPHONE (OUTPATIENT)
Dept: INTERNAL MEDICINE | Facility: CLINIC | Age: 48
End: 2018-03-01

## 2018-03-01 RX ORDER — DICLOFENAC SODIUM 10 MG/G
GEL TOPICAL
Qty: 100 G | Refills: 0 | Status: SHIPPED | OUTPATIENT
Start: 2018-03-01 | End: 2018-03-05 | Stop reason: SDUPTHER

## 2018-03-01 NOTE — TELEPHONE ENCOUNTER
----- Message from Virgen Cuellar MA sent at 3/1/2018  8:51 AM CST -----  Contact: self [phone]  Pt is requesting a call from office pt states she's in pain and would like to get xray results. Please, advise. Thanks.

## 2018-03-05 ENCOUNTER — OFFICE VISIT (OUTPATIENT)
Dept: INTERNAL MEDICINE | Facility: CLINIC | Age: 48
End: 2018-03-05
Attending: FAMILY MEDICINE
Payer: COMMERCIAL

## 2018-03-05 VITALS
SYSTOLIC BLOOD PRESSURE: 102 MMHG | WEIGHT: 252.44 LBS | BODY MASS INDEX: 47.66 KG/M2 | OXYGEN SATURATION: 96 % | DIASTOLIC BLOOD PRESSURE: 72 MMHG | HEART RATE: 75 BPM | TEMPERATURE: 98 F | HEIGHT: 61 IN

## 2018-03-05 DIAGNOSIS — M79.662 PAIN AND SWELLING OF LEFT LOWER LEG: ICD-10-CM

## 2018-03-05 DIAGNOSIS — I10 HYPERTENSION, ESSENTIAL: ICD-10-CM

## 2018-03-05 DIAGNOSIS — M54.5 LOW BACK PAIN, UNSPECIFIED BACK PAIN LATERALITY, UNSPECIFIED CHRONICITY, WITH SCIATICA PRESENCE UNSPECIFIED: ICD-10-CM

## 2018-03-05 DIAGNOSIS — M79.89 PAIN AND SWELLING OF LEFT LOWER LEG: ICD-10-CM

## 2018-03-05 DIAGNOSIS — S96.912A STRAIN OF LEFT ANKLE, INITIAL ENCOUNTER: Primary | ICD-10-CM

## 2018-03-05 PROCEDURE — 99999 PR PBB SHADOW E&M-EST. PATIENT-LVL IV: CPT | Mod: PBBFAC,,, | Performed by: FAMILY MEDICINE

## 2018-03-05 PROCEDURE — 3078F DIAST BP <80 MM HG: CPT | Mod: S$GLB,,, | Performed by: FAMILY MEDICINE

## 2018-03-05 PROCEDURE — 3074F SYST BP LT 130 MM HG: CPT | Mod: S$GLB,,, | Performed by: FAMILY MEDICINE

## 2018-03-05 PROCEDURE — 99203 OFFICE O/P NEW LOW 30 MIN: CPT | Mod: S$GLB,,, | Performed by: FAMILY MEDICINE

## 2018-03-05 RX ORDER — IBUPROFEN 600 MG/1
600 TABLET ORAL EVERY 6 HOURS PRN
Qty: 60 TABLET | Refills: 0 | Status: SHIPPED | OUTPATIENT
Start: 2018-03-05 | End: 2018-10-25

## 2018-03-05 RX ORDER — OMEPRAZOLE 20 MG/1
20 CAPSULE, DELAYED RELEASE ORAL DAILY
Qty: 30 CAPSULE | Refills: 0 | Status: SHIPPED | OUTPATIENT
Start: 2018-03-05 | End: 2018-10-25

## 2018-03-05 NOTE — PROGRESS NOTES
Subjective:       Patient ID: Shiloh Chatterjee is a 47 y.o. female.    Chief Complaint: Low-back Pain and Ankle Pain    HPI  Review of Systems   Constitutional: Positive for fatigue. Negative for chills and fever.   HENT: Negative for congestion and trouble swallowing.    Eyes: Negative for redness.   Respiratory: Negative for cough, chest tightness and shortness of breath.    Cardiovascular: Negative for chest pain, palpitations and leg swelling.   Gastrointestinal: Negative for abdominal pain and blood in stool.   Genitourinary: Negative for hematuria and menstrual problem.   Musculoskeletal: Positive for arthralgias, back pain and gait problem. Negative for joint swelling, myalgias and neck pain.   Skin: Negative for color change and rash.   Neurological: Negative for tremors, speech difficulty, weakness, numbness and headaches.   Hematological: Negative for adenopathy. Does not bruise/bleed easily.   Psychiatric/Behavioral: Positive for dysphoric mood. Negative for behavioral problems, confusion and sleep disturbance. The patient is not nervous/anxious.        Objective:      Physical Exam   Constitutional: She is oriented to person, place, and time. She appears well-developed and well-nourished. No distress.   Neck: Neck supple.   Pulmonary/Chest: Effort normal.   Abdominal: There is no tenderness. There is no rebound and no CVA tenderness.   Musculoskeletal: She exhibits no edema.        Right hip: She exhibits normal range of motion and normal strength.        Left hip: She exhibits normal range of motion and normal strength.        Thoracic back: She exhibits normal range of motion and no tenderness.        Lumbar back: She exhibits normal range of motion, no tenderness and no spasm.        Right lower leg: She exhibits no edema.        Left lower leg: She exhibits no edema.        Left foot: There is tenderness and swelling. There is normal range of motion, normal capillary refill and no deformity.    Neurological: She is alert and oriented to person, place, and time. She has normal strength. No sensory deficit. She displays a negative Romberg sign. Coordination and gait normal. GCS eye subscore is 4. GCS verbal subscore is 5. GCS motor subscore is 6.   Reflex Scores:       Patellar reflexes are 0 on the right side and 0 on the left side.       Achilles reflexes are 0 on the right side and 0 on the left side.  Equivocal L SLR.   Skin: Skin is warm and dry. No rash noted.   Psychiatric: She has a normal mood and affect. Her behavior is normal. Judgment and thought content normal.   Nursing note and vitals reviewed.      Assessment:       1. Strain of left ankle, initial encounter    2. Pain and swelling of left lower leg    3. Low back pain, unspecified back pain laterality, unspecified chronicity, with sciatica presence unspecified    4. Hypertension, essential        Plan:   Shiloh was seen today for low-back pain and ankle pain.    Diagnoses and all orders for this visit:    Strain of left ankle, initial encounter  -     Ambulatory referral to Podiatry    Pain and swelling of left lower leg  -     Ambulatory referral to Podiatry    Low back pain, unspecified back pain laterality, unspecified chronicity, with sciatica presence unspecified  -     Ambulatory Consult to [facility]    Hypertension, essential    Other orders  -     ibuprofen (ADVIL,MOTRIN) 600 MG tablet; Take 1 tablet (600 mg total) by mouth every 6 (six) hours as needed for Pain.  -     omeprazole (PRILOSEC) 20 MG capsule; Take 1 capsule (20 mg total) by mouth once daily.      See meds, orders, follow up, routing and instructions sections of encounter.  New patient 47 back pain, she also reports foot pain.  Her back pain has been   chronic.  She did have a history of sciatica.  She states she used to see Dr. [facility] at [facility], states she is currently our of her   gabapentin.    Her left foot pain two to three months,  diffuse, more centralized to the left   lateral malleolus.    She recently saw Sara Kunz.  Her x-rays were normal.    The patient has bipolar illness and is followed [facility]   and additionally reports that her  had  approximately a month ago of   myocardial infarction in his sleep, at age 42.  She also has a 16-year-old son   who has psychiatric conditions who was present with her today.    RECOMMENDATIONS:  Referrals to Podiatry and Back Clinic, trial of ibuprofen, but   not prolonged secondary to history of GERD.  I did renew her omeprazole as   well, and follow up with us p.rsarai.      YOBANI/CHARLENE  dd: 2018 13:47:45 (CST)  td: 2018 02:22:00 (CST)  Doc ID   #[id number]  Job ID #[id number]    CC:

## 2018-04-02 ENCOUNTER — TELEPHONE (OUTPATIENT)
Dept: SPINE | Facility: CLINIC | Age: 48
End: 2018-04-02

## 2018-04-02 DIAGNOSIS — M54.50 LUMBAR PAIN: Primary | ICD-10-CM

## 2018-04-24 ENCOUNTER — TELEPHONE (OUTPATIENT)
Dept: INTERNAL MEDICINE | Facility: CLINIC | Age: 48
End: 2018-04-24

## 2018-04-24 NOTE — TELEPHONE ENCOUNTER
Spoke with pt and she was ok with me rescheduling her appt with us a letter will be mailed out as a remidner for pt appt date and time

## 2018-10-25 ENCOUNTER — HOSPITAL ENCOUNTER (INPATIENT)
Facility: HOSPITAL | Age: 48
LOS: 1 days | Discharge: HOME | DRG: 189 | End: 2018-10-27
Attending: EMERGENCY MEDICINE | Admitting: EMERGENCY MEDICINE
Payer: COMMERCIAL

## 2018-10-25 DIAGNOSIS — F31.9 BIPOLAR 1 DISORDER, DEPRESSED: ICD-10-CM

## 2018-10-25 DIAGNOSIS — R06.89 HYPERCAPNIA: ICD-10-CM

## 2018-10-25 DIAGNOSIS — I10 HYPERTENSION, ESSENTIAL: ICD-10-CM

## 2018-10-25 DIAGNOSIS — E66.2 OBESITY HYPOVENTILATION SYNDROME: ICD-10-CM

## 2018-10-25 DIAGNOSIS — R07.9 CHEST PAIN: ICD-10-CM

## 2018-10-25 DIAGNOSIS — E66.01 MORBID OBESITY: ICD-10-CM

## 2018-10-25 DIAGNOSIS — F41.1 GAD (GENERALIZED ANXIETY DISORDER): ICD-10-CM

## 2018-10-25 DIAGNOSIS — R06.02 SHORTNESS OF BREATH: ICD-10-CM

## 2018-10-25 DIAGNOSIS — J96.22 ACUTE ON CHRONIC RESPIRATORY FAILURE WITH HYPOXIA AND HYPERCAPNIA: Primary | ICD-10-CM

## 2018-10-25 DIAGNOSIS — J81.0 ACUTE PULMONARY EDEMA: ICD-10-CM

## 2018-10-25 DIAGNOSIS — R60.0 PERIPHERAL EDEMA: ICD-10-CM

## 2018-10-25 DIAGNOSIS — F40.01 PANIC DISORDER WITH AGORAPHOBIA: ICD-10-CM

## 2018-10-25 DIAGNOSIS — J96.21 ACUTE ON CHRONIC RESPIRATORY FAILURE WITH HYPOXIA AND HYPERCAPNIA: Primary | ICD-10-CM

## 2018-10-25 DIAGNOSIS — R06.00 DYSPNEA: ICD-10-CM

## 2018-10-25 LAB
ALBUMIN SERPL BCP-MCNC: 3.5 G/DL
ALP SERPL-CCNC: 101 U/L
ALT SERPL W/O P-5'-P-CCNC: 36 U/L
ANION GAP SERPL CALC-SCNC: 7 MMOL/L
AST SERPL-CCNC: 26 U/L
BASOPHILS # BLD AUTO: 0.03 K/UL
BASOPHILS NFR BLD: 0.4 %
BILIRUB SERPL-MCNC: 0.3 MG/DL
BNP SERPL-MCNC: 66 PG/ML
BUN SERPL-MCNC: 12 MG/DL
CALCIUM SERPL-MCNC: 9 MG/DL
CHLORIDE SERPL-SCNC: 102 MMOL/L
CO2 SERPL-SCNC: 31 MMOL/L
CREAT SERPL-MCNC: 0.8 MG/DL
D DIMER PPP IA.FEU-MCNC: 0.87 MG/L FEU
DIFFERENTIAL METHOD: ABNORMAL
EOSINOPHIL # BLD AUTO: 0.3 K/UL
EOSINOPHIL NFR BLD: 4.4 %
ERYTHROCYTE [DISTWIDTH] IN BLOOD BY AUTOMATED COUNT: 14.8 %
EST. GFR  (AFRICAN AMERICAN): >60 ML/MIN/1.73 M^2
EST. GFR  (NON AFRICAN AMERICAN): >60 ML/MIN/1.73 M^2
GLUCOSE SERPL-MCNC: 113 MG/DL
HCT VFR BLD AUTO: 42.3 %
HGB BLD-MCNC: 13.3 G/DL
IMM GRANULOCYTES # BLD AUTO: 0.03 K/UL
IMM GRANULOCYTES NFR BLD AUTO: 0.4 %
LYMPHOCYTES # BLD AUTO: 1.2 K/UL
LYMPHOCYTES NFR BLD: 15.5 %
MCH RBC QN AUTO: 30.5 PG
MCHC RBC AUTO-ENTMCNC: 31.4 G/DL
MCV RBC AUTO: 97 FL
MONOCYTES # BLD AUTO: 0.9 K/UL
MONOCYTES NFR BLD: 12 %
NEUTROPHILS # BLD AUTO: 5.2 K/UL
NEUTROPHILS NFR BLD: 67.3 %
NRBC BLD-RTO: 0 /100 WBC
PLATELET # BLD AUTO: 200 K/UL
PMV BLD AUTO: 9.9 FL
POTASSIUM SERPL-SCNC: 4.3 MMOL/L
PROT SERPL-MCNC: 7.8 G/DL
RBC # BLD AUTO: 4.36 M/UL
SODIUM SERPL-SCNC: 140 MMOL/L
TROPONIN I SERPL DL<=0.01 NG/ML-MCNC: <0.006 NG/ML
TSH SERPL DL<=0.005 MIU/L-ACNC: 4.15 UIU/ML
WBC # BLD AUTO: 7.69 K/UL

## 2018-10-25 PROCEDURE — 99285 EMERGENCY DEPT VISIT HI MDM: CPT | Mod: 25

## 2018-10-25 PROCEDURE — 93005 ELECTROCARDIOGRAM TRACING: CPT

## 2018-10-25 PROCEDURE — 96375 TX/PRO/DX INJ NEW DRUG ADDON: CPT

## 2018-10-25 PROCEDURE — 94761 N-INVAS EAR/PLS OXIMETRY MLT: CPT

## 2018-10-25 PROCEDURE — 84484 ASSAY OF TROPONIN QUANT: CPT

## 2018-10-25 PROCEDURE — 94640 AIRWAY INHALATION TREATMENT: CPT

## 2018-10-25 PROCEDURE — 85025 COMPLETE CBC W/AUTO DIFF WBC: CPT

## 2018-10-25 PROCEDURE — 83880 ASSAY OF NATRIURETIC PEPTIDE: CPT

## 2018-10-25 PROCEDURE — 84439 ASSAY OF FREE THYROXINE: CPT

## 2018-10-25 PROCEDURE — 80053 COMPREHEN METABOLIC PANEL: CPT

## 2018-10-25 PROCEDURE — 96374 THER/PROPH/DIAG INJ IV PUSH: CPT

## 2018-10-25 PROCEDURE — 93010 ELECTROCARDIOGRAM REPORT: CPT | Mod: ,,, | Performed by: INTERNAL MEDICINE

## 2018-10-25 PROCEDURE — 25000242 PHARM REV CODE 250 ALT 637 W/ HCPCS: Performed by: EMERGENCY MEDICINE

## 2018-10-25 PROCEDURE — 84443 ASSAY THYROID STIM HORMONE: CPT

## 2018-10-25 PROCEDURE — 63600175 PHARM REV CODE 636 W HCPCS: Performed by: EMERGENCY MEDICINE

## 2018-10-25 PROCEDURE — 99284 EMERGENCY DEPT VISIT MOD MDM: CPT | Mod: ,,, | Performed by: EMERGENCY MEDICINE

## 2018-10-25 PROCEDURE — 27000221 HC OXYGEN, UP TO 24 HOURS

## 2018-10-25 PROCEDURE — 85379 FIBRIN DEGRADATION QUANT: CPT

## 2018-10-25 RX ORDER — FUROSEMIDE 10 MG/ML
40 INJECTION INTRAMUSCULAR; INTRAVENOUS
Status: COMPLETED | OUTPATIENT
Start: 2018-10-25 | End: 2018-10-25

## 2018-10-25 RX ORDER — DIVALPROEX SODIUM 125 MG/1
125 TABLET, DELAYED RELEASE ORAL 3 TIMES DAILY
Status: ON HOLD | COMMUNITY
End: 2018-10-26

## 2018-10-25 RX ORDER — IPRATROPIUM BROMIDE AND ALBUTEROL SULFATE 2.5; .5 MG/3ML; MG/3ML
3 SOLUTION RESPIRATORY (INHALATION)
Status: COMPLETED | OUTPATIENT
Start: 2018-10-25 | End: 2018-10-25

## 2018-10-25 RX ADMIN — IPRATROPIUM BROMIDE AND ALBUTEROL SULFATE 3 ML: .5; 3 SOLUTION RESPIRATORY (INHALATION) at 11:10

## 2018-10-25 RX ADMIN — FUROSEMIDE 40 MG: 10 INJECTION, SOLUTION INTRAMUSCULAR; INTRAVENOUS at 11:10

## 2018-10-25 RX ADMIN — IPRATROPIUM BROMIDE AND ALBUTEROL SULFATE 3 ML: .5; 3 SOLUTION RESPIRATORY (INHALATION) at 10:10

## 2018-10-26 PROBLEM — J81.0 ACUTE PULMONARY EDEMA: Status: ACTIVE | Noted: 2018-10-26

## 2018-10-26 PROBLEM — R06.89 HYPERCAPNIA: Status: ACTIVE | Noted: 2018-10-26

## 2018-10-26 PROBLEM — J96.22 ACUTE ON CHRONIC RESPIRATORY FAILURE WITH HYPOXIA AND HYPERCAPNIA: Status: ACTIVE | Noted: 2018-10-26

## 2018-10-26 PROBLEM — J96.21 ACUTE ON CHRONIC RESPIRATORY FAILURE WITH HYPOXIA AND HYPERCAPNIA: Status: ACTIVE | Noted: 2018-10-26

## 2018-10-26 PROBLEM — E66.2 OBESITY HYPOVENTILATION SYNDROME: Status: ACTIVE | Noted: 2018-10-26

## 2018-10-26 PROBLEM — R06.02 SHORTNESS OF BREATH: Status: ACTIVE | Noted: 2018-10-26

## 2018-10-26 LAB
ALLENS TEST: ABNORMAL
B-HCG UR QL: NEGATIVE
B-HCG UR QL: NEGATIVE
BILIRUB UR QL STRIP: NEGATIVE
CLARITY UR REFRACT.AUTO: CLEAR
COLOR UR AUTO: NORMAL
CTP QC/QA: YES
DELSYS: ABNORMAL
DIASTOLIC DYSFUNCTION: NO
ESTIMATED AVG GLUCOSE: 103 MG/DL
ESTIMATED PA SYSTOLIC PRESSURE: 37.7
FIO2: 2
FLOW: 2
GLUCOSE UR QL STRIP: NEGATIVE
HBA1C MFR BLD HPLC: 5.2 %
HCO3 UR-SCNC: 35.9 MMOL/L (ref 24–28)
HGB UR QL STRIP: NEGATIVE
KETONES UR QL STRIP: NEGATIVE
LEUKOCYTE ESTERASE UR QL STRIP: NEGATIVE
MITRAL VALVE REGURGITATION: NORMAL
MODE: ABNORMAL
NITRITE UR QL STRIP: NEGATIVE
PCO2 BLDA: 71.3 MMHG (ref 35–45)
PH SMN: 7.31 [PH] (ref 7.35–7.45)
PH UR STRIP: 6 [PH] (ref 5–8)
PO2 BLDA: 75 MMHG (ref 80–100)
POC BE: 10 MMOL/L
POC SATURATED O2: 93 % (ref 95–100)
POC TCO2: 38 MMOL/L (ref 23–27)
PROT UR QL STRIP: NEGATIVE
RETIRED EF AND QEF - SEE NOTES: 60 (ref 55–65)
SAMPLE: ABNORMAL
SITE: ABNORMAL
SP GR UR STRIP: 1.01 (ref 1–1.03)
SP02: 91
T4 FREE SERPL-MCNC: 0.76 NG/DL
TRICUSPID VALVE REGURGITATION: NORMAL
URN SPEC COLLECT METH UR: NORMAL

## 2018-10-26 PROCEDURE — 96374 THER/PROPH/DIAG INJ IV PUSH: CPT

## 2018-10-26 PROCEDURE — 63600175 PHARM REV CODE 636 W HCPCS: Performed by: EMERGENCY MEDICINE

## 2018-10-26 PROCEDURE — 25000003 PHARM REV CODE 250: Performed by: INTERNAL MEDICINE

## 2018-10-26 PROCEDURE — 25000003 PHARM REV CODE 250: Performed by: EMERGENCY MEDICINE

## 2018-10-26 PROCEDURE — 99900035 HC TECH TIME PER 15 MIN (STAT)

## 2018-10-26 PROCEDURE — 81003 URINALYSIS AUTO W/O SCOPE: CPT

## 2018-10-26 PROCEDURE — 81025 URINE PREGNANCY TEST: CPT | Performed by: EMERGENCY MEDICINE

## 2018-10-26 PROCEDURE — 81025 URINE PREGNANCY TEST: CPT

## 2018-10-26 PROCEDURE — 99233 SBSQ HOSP IP/OBS HIGH 50: CPT | Mod: ,,, | Performed by: INTERNAL MEDICINE

## 2018-10-26 PROCEDURE — 99223 1ST HOSP IP/OBS HIGH 75: CPT | Mod: ,,, | Performed by: HOSPITALIST

## 2018-10-26 PROCEDURE — 11000001 HC ACUTE MED/SURG PRIVATE ROOM

## 2018-10-26 PROCEDURE — 63600175 PHARM REV CODE 636 W HCPCS: Performed by: STUDENT IN AN ORGANIZED HEALTH CARE EDUCATION/TRAINING PROGRAM

## 2018-10-26 PROCEDURE — 83036 HEMOGLOBIN GLYCOSYLATED A1C: CPT

## 2018-10-26 PROCEDURE — 25500020 PHARM REV CODE 255: Performed by: EMERGENCY MEDICINE

## 2018-10-26 PROCEDURE — 82803 BLOOD GASES ANY COMBINATION: CPT

## 2018-10-26 PROCEDURE — 25000003 PHARM REV CODE 250: Performed by: STUDENT IN AN ORGANIZED HEALTH CARE EDUCATION/TRAINING PROGRAM

## 2018-10-26 PROCEDURE — 36600 WITHDRAWAL OF ARTERIAL BLOOD: CPT

## 2018-10-26 PROCEDURE — 93306 TTE W/DOPPLER COMPLETE: CPT | Mod: 26,,, | Performed by: INTERNAL MEDICINE

## 2018-10-26 RX ORDER — AMLODIPINE BESYLATE 5 MG/1
5 TABLET ORAL DAILY
Status: ON HOLD | COMMUNITY
End: 2018-10-27 | Stop reason: HOSPADM

## 2018-10-26 RX ORDER — IBUPROFEN 600 MG/1
600 TABLET ORAL EVERY 6 HOURS PRN
COMMUNITY

## 2018-10-26 RX ORDER — GLUCAGON 1 MG
1 KIT INJECTION
Status: DISCONTINUED | OUTPATIENT
Start: 2018-10-26 | End: 2018-10-27 | Stop reason: HOSPADM

## 2018-10-26 RX ORDER — LOSARTAN POTASSIUM 50 MG/1
50 TABLET ORAL DAILY
Status: DISCONTINUED | OUTPATIENT
Start: 2018-10-26 | End: 2018-10-27 | Stop reason: HOSPADM

## 2018-10-26 RX ORDER — DULOXETIN HYDROCHLORIDE 30 MG/1
30 CAPSULE, DELAYED RELEASE ORAL DAILY
COMMUNITY

## 2018-10-26 RX ORDER — FUROSEMIDE 10 MG/ML
60 INJECTION INTRAMUSCULAR; INTRAVENOUS 2 TIMES DAILY
Status: DISCONTINUED | OUTPATIENT
Start: 2018-10-26 | End: 2018-10-27 | Stop reason: HOSPADM

## 2018-10-26 RX ORDER — SODIUM CHLORIDE 0.9 % (FLUSH) 0.9 %
5 SYRINGE (ML) INJECTION
Status: DISCONTINUED | OUTPATIENT
Start: 2018-10-26 | End: 2018-10-27 | Stop reason: HOSPADM

## 2018-10-26 RX ORDER — RISPERIDONE 1 MG/1
1 TABLET ORAL DAILY
Status: DISCONTINUED | OUTPATIENT
Start: 2018-10-26 | End: 2018-10-27 | Stop reason: HOSPADM

## 2018-10-26 RX ORDER — ACETAMINOPHEN 500 MG
1000 TABLET ORAL
Status: COMPLETED | OUTPATIENT
Start: 2018-10-26 | End: 2018-10-26

## 2018-10-26 RX ORDER — DIVALPROEX SODIUM 125 MG/1
125 TABLET, DELAYED RELEASE ORAL 3 TIMES DAILY
Status: DISCONTINUED | OUTPATIENT
Start: 2018-10-26 | End: 2018-10-27 | Stop reason: HOSPADM

## 2018-10-26 RX ORDER — IPRATROPIUM BROMIDE AND ALBUTEROL SULFATE 2.5; .5 MG/3ML; MG/3ML
3 SOLUTION RESPIRATORY (INHALATION) EVERY 6 HOURS PRN
Status: DISCONTINUED | OUTPATIENT
Start: 2018-10-26 | End: 2018-10-27 | Stop reason: HOSPADM

## 2018-10-26 RX ORDER — GABAPENTIN 100 MG/1
600 CAPSULE ORAL 3 TIMES DAILY
COMMUNITY
End: 2018-11-07

## 2018-10-26 RX ORDER — ACETAMINOPHEN 325 MG/1
650 TABLET ORAL EVERY 4 HOURS PRN
Status: DISCONTINUED | OUTPATIENT
Start: 2018-10-26 | End: 2018-10-27 | Stop reason: HOSPADM

## 2018-10-26 RX ORDER — RISPERIDONE 37.5MG/2ML
37.5 KIT INTRAMUSCULAR
COMMUNITY

## 2018-10-26 RX ORDER — IBUPROFEN 200 MG
16 TABLET ORAL
Status: DISCONTINUED | OUTPATIENT
Start: 2018-10-26 | End: 2018-10-27 | Stop reason: HOSPADM

## 2018-10-26 RX ORDER — ACETAMINOPHEN 500 MG
1000 TABLET ORAL DAILY PRN
COMMUNITY

## 2018-10-26 RX ORDER — ALBUTEROL SULFATE 90 UG/1
1-2 AEROSOL, METERED RESPIRATORY (INHALATION) EVERY 6 HOURS PRN
Qty: 1 INHALER | Refills: 0 | Status: SHIPPED | OUTPATIENT
Start: 2018-10-26 | End: 2018-10-26 | Stop reason: SDUPTHER

## 2018-10-26 RX ORDER — METHYLPREDNISOLONE SOD SUCC 125 MG
125 VIAL (EA) INJECTION
Status: COMPLETED | OUTPATIENT
Start: 2018-10-26 | End: 2018-10-26

## 2018-10-26 RX ORDER — FUROSEMIDE 40 MG/1
40 TABLET ORAL DAILY
Qty: 7 TABLET | Refills: 0 | Status: SHIPPED | OUTPATIENT
Start: 2018-10-26 | End: 2018-10-27 | Stop reason: HOSPADM

## 2018-10-26 RX ORDER — ALBUTEROL SULFATE 90 UG/1
1-2 AEROSOL, METERED RESPIRATORY (INHALATION) EVERY 6 HOURS PRN
Qty: 1 INHALER | Refills: 0 | Status: SHIPPED | OUTPATIENT
Start: 2018-10-26 | End: 2019-10-26

## 2018-10-26 RX ORDER — IPRATROPIUM BROMIDE AND ALBUTEROL SULFATE 2.5; .5 MG/3ML; MG/3ML
3 SOLUTION RESPIRATORY (INHALATION)
Status: DISCONTINUED | OUTPATIENT
Start: 2018-10-26 | End: 2018-10-26

## 2018-10-26 RX ORDER — IBUPROFEN 200 MG
24 TABLET ORAL
Status: DISCONTINUED | OUTPATIENT
Start: 2018-10-26 | End: 2018-10-27 | Stop reason: HOSPADM

## 2018-10-26 RX ORDER — PREDNISONE 20 MG/1
40 TABLET ORAL DAILY
Qty: 10 TABLET | Refills: 0 | Status: SHIPPED | OUTPATIENT
Start: 2018-10-26 | End: 2018-10-31

## 2018-10-26 RX ADMIN — IOHEXOL 100 ML: 350 INJECTION, SOLUTION INTRAVENOUS at 02:10

## 2018-10-26 RX ADMIN — METHYLPREDNISOLONE SODIUM SUCCINATE 125 MG: 125 INJECTION, POWDER, FOR SOLUTION INTRAMUSCULAR; INTRAVENOUS at 04:10

## 2018-10-26 RX ADMIN — DIVALPROEX SODIUM 125 MG: 125 TABLET, DELAYED RELEASE ORAL at 11:10

## 2018-10-26 RX ADMIN — RISPERIDONE 1 MG: 1 TABLET ORAL at 10:10

## 2018-10-26 RX ADMIN — FUROSEMIDE 60 MG: 10 INJECTION, SOLUTION INTRAMUSCULAR; INTRAVENOUS at 10:10

## 2018-10-26 RX ADMIN — ACETAMINOPHEN 1000 MG: 500 TABLET, FILM COATED ORAL at 03:10

## 2018-10-26 RX ADMIN — DIVALPROEX SODIUM 125 MG: 125 TABLET, DELAYED RELEASE ORAL at 09:10

## 2018-10-26 RX ADMIN — FUROSEMIDE 60 MG: 10 INJECTION, SOLUTION INTRAMUSCULAR; INTRAVENOUS at 05:10

## 2018-10-26 RX ADMIN — LOSARTAN POTASSIUM 50 MG: 50 TABLET ORAL at 05:10

## 2018-10-26 RX ADMIN — ACETAMINOPHEN 650 MG: 325 TABLET ORAL at 10:10

## 2018-10-26 NOTE — ED PROVIDER NOTES
"Encounter Date: 10/25/2018    SCRIBE #1 NOTE: I, Dean Whiting, am scribing for, and in the presence of,  Dr. Dubois. I have scribed the entire note.       History     Chief Complaint   Patient presents with    Shortness of Breath     Pt reports SOB, dry cough, and midsternal chest pain since "all summer long". Pt denies seeing physcian for problem. Pt 86% O2 sats in triage. Pt denies fever, abdominal pain, N/V.     Chest Pain     Time seen by provider: 10:25 PM    This is a 48 y.o. female who presents with complaint of SOB, mid sternal chest pain, and bilateral LE edema a few months ago. The patient notes the SOB is gradually progressive and worsens when she lies down so she has been sleeping in a chair sitting up. The patient claims to have had a loss of appetite, an increase in weight, and increased urination. The patient denies nausea, vomiting, CP, and fever.      The history is provided by the patient.     Review of patient's allergies indicates:   Allergen Reactions    Latex, natural rubber      Past Medical History:   Diagnosis Date    Bipolar disorder     Depression     Headache(784.0)     History of psychiatric care     History of psychiatric hospitalization     HTN (hypertension)     Hypertension     Sonja     Polysubstance dependence 2015    Suicide attempt     Therapy      Past Surgical History:   Procedure Laterality Date     SECTION      CHOLECYSTECTOMY      CYST REMOVAL      on ovary, laproscopic    LAPAROSCOPIC CHOLECYSTECTOMY N/A 2016    Performed by Stevenson Vail MD at [facility] [address] [place] [address]    SINUS SURGERY      TONSILLECTOMY       Family History   Problem Relation Age of Onset    Hypertension Mother     Diabetes Father      Social History     Tobacco Use    Smoking status: Never Smoker    Smokeless tobacco: Never Used   Substance Use Topics    Alcohol use: No    Drug use: No     Review of Systems   Constitutional: Positive for appetite change (loss) " and unexpected weight change (weight gain). Negative for fever.   HENT: Negative.    Eyes: Negative.    Respiratory: Positive for shortness of breath.    Cardiovascular: Positive for leg swelling (bilateral LE). Negative for chest pain.   Gastrointestinal: Negative.  Negative for nausea and vomiting.   Genitourinary: Positive for frequency.   Musculoskeletal: Negative.    Skin: Negative.    Neurological: Negative.    All other systems reviewed and are negative.      Physical Exam     Initial Vitals [10/25/18 2208]   BP Pulse Resp Temp SpO2   (!) 173/86 93 19 98.7 °F (37.1 °C) (!) 87 %      MAP       --         Physical Exam    Nursing note and vitals reviewed.  Constitutional: She appears well-developed and well-nourished.   Obese   HENT:   Head: Normocephalic.   Eyes: EOM are normal. Pupils are equal, round, and reactive to light.   Neck: Normal range of motion. Neck supple.   Cardiovascular:   Bilateral Lower Extremity edema   Pulmonary/Chest: Breath sounds normal. No respiratory distress. She has no wheezes. She has no rhonchi. She has no rales.   Hypoxia at 87     Abdominal: Soft.   Musculoskeletal: Normal range of motion.   Neurological: She is alert and oriented to person, place, and time.   Skin: Skin is dry.   Psychiatric: She has a normal mood and affect.         ED Course   Procedures  Labs Reviewed   CBC W/ AUTO DIFFERENTIAL - Abnormal; Notable for the following components:       Result Value    MCHC 31.4 (*)     RDW 14.8 (*)     Lymph% 15.5 (*)     All other components within normal limits   COMPREHENSIVE METABOLIC PANEL - Abnormal; Notable for the following components:    CO2 31 (*)     Glucose 113 (*)     Anion Gap 7 (*)     All other components within normal limits   TSH - Abnormal; Notable for the following components:    TSH 4.154 (*)     All other components within normal limits   D DIMER, QUANTITATIVE - Abnormal; Notable for the following components:    D-Dimer 0.87 (*)     All other components  within normal limits   B-TYPE NATRIURETIC PEPTIDE   URINALYSIS, REFLEX TO URINE CULTURE    Narrative:     1 ORANGE CUP  Preferred Collection Type->Urine, Clean Catch   TROPONIN I   T4, FREE   PREGNANCY TEST, URINE RAPID    Narrative:     1 ORANGE CUP  Preferred Collection Type->Urine, Clean Catch  [id number]  Add on PREGU per Rodney Dubois MD @ 01:56  10/26/2018    POCT URINE PREGNANCY     EKG Readings: (Independently Interpreted)   Initial Reading: No STEMI. Rhythm: Normal Sinus Rhythm. Heart Rate: 85. Ectopy: No Ectopy. Conduction: Normal. ST Segments: Normal ST Segments. T Waves: Normal. Axis: Normal. Clinical Impression: Sinus Arrhythmia       Imaging Results          CTA Chest Non-Coronary (PE Study) (Final result)     Abnormal  Result time 10/26/18 03:18:48    Final result by Santana Suarez MD (10/26/18 03:18:48)                 Impression:      No pulmonary thromboembolism.    Increased attenuation of the lungs in a mosaic pattern possibly related to pulmonary edema or incomplete inspiration.  Platelike atelectasis at both lung bases.    Possible narrowing of the right lower lobe posterior segment bronchus as above.  It has a similar appearance on CT renal stone examination June 29, 2017.  Further evaluation as clinically indicated.    Aberrant right subclavian artery.  This creates compression of the esophagus.  Possible esophageal diverticulum.  Recommend further evaluation with outpatient fluoroscopic esophagram as clinically indicated.    Hepatomegaly with steatosis.  Prior cholecystectomy.    This report was flagged in Epic as containing an incidental finding.    Electronically signed by resident: John Siddiqi  Date:    10/26/2018  Time:    02:32    Electronically signed by: Santana Suarez MD  Date:    10/26/2018  Time:    03:18             Narrative:    EXAMINATION:  CTA CHEST NON CORONARY    CLINICAL HISTORY:  Dyspnea, cardiac origin suspected;    TECHNIQUE:  Low dose axial images,  sagittal and coronal reformations were obtained from the thoracic inlet to the lung bases following the IV administration of 100 mL of Omnipaque 350.    COMPARISON:  Chest radiograph 10/25/2018    FINDINGS:  Base of Neck: No significant abnormality.    Thoracic soft tissues: No axillary lymphadenopathy.  Soft tissues appear otherwise unremarkable.    Aorta: Aberrant right subclavian incidentally noted.  Aorta otherwise unremarkable.    Heart: Normal size. No effusion.    Pulmonary vasculature: No pulmonary thromboembolism.  Evaluation is adequate to the level of the segmental arteries.    Peg/Mediastinum: No pathologic leon enlargement.    Airways: Possible narrowing of the right lower lobe posterior segment bronchus as seen on series 3, image 42 and 43.    Lungs/Pleura: Slight elevation the right hemidiaphragm.  Mosaic attenuation and slight increased parenchymal attenuation with scattered ground-glass opacification possibly related to pulmonary edema or incomplete inspiration during imaging.  Platelike atelectasis of both lung base.    Esophagus: Compression of the esophagus from the average right subclavian artery noted.  Possible esophageal diverticulum.  Outpatient esophagram recommended.    Upper Abdomen: Incidental note of surgical cholecystectomy, and mild hepatomegaly with decreased attenuation suggesting steatosis.    Bones: No acute fracture. No suspicious lytic or sclerotic lesions.                               X-Ray Chest PA And Lateral (Final result)  Result time 10/25/18 23:46:31    Final result by Santana Suarez MD (10/25/18 23:46:31)                 Impression:      Cardiomegaly with central vascular congestion and bilateral edema.      Electronically signed by: Santana Suarez MD  Date:    10/25/2018  Time:    23:46             Narrative:    EXAMINATION:  XR CHEST PA AND LATERAL    CLINICAL HISTORY:  Dyspnea, unspecified    TECHNIQUE:  PA and lateral views of the chest were  performed.    COMPARISON:  January 8, 2018.    FINDINGS:  Platelike atelectasis right middle lobe.    There is no consolidation, effusion, or pneumothorax.    Cardiomegaly with central vascular congestion and bilateral edema.    Regional osseous structures are unremarkable.                                 Medical Decision Making:   ED Management:  Sx better in ER. Less likely pulm edema with neg bnp. Sx improved with nebs. But still hypoxic off of oxygen with o2 sats in 85-88 range. Not just post albuterol effect. Will admit.     I, Dr. Rodney Dubois, personally performed the services described in this documentation. All medical record entries made by the scribe were at my direction and in my presence.  I have reviewed the chart and agree that the record reflects my personal performance and is accurate and complete. Rodney Dubois MD.  3:43 AM 10/26/2018                        Clinical Impression:     1. Peripheral edema    2. Chest pain    3. Dyspnea                                   Rodney Dubois MD  10/26/18 0343       Rodney Dubois MD  10/26/18 0349       Rodney Dubois MD  10/26/18 0358

## 2018-10-26 NOTE — PLAN OF CARE
Patient approved for home BiPap; appt with sleep clinic scheduled for 11/8 at 1:00pm. [facility] to deliver today to patient's room and respiratory can set up tomorrow prior to discharge. Mariah CARSON updated. Will follow.

## 2018-10-26 NOTE — H&P
"[facility] Hospital Medicine  History & Physical    Patient Name: Shiloh Chatterjee  MRN: [id number]  Admission Date: 10/25/2018  Attending Physician: Rodney Dubois MD   Primary Care Provider: [facility] Medicine Team: [facility] 3 Radha Ramos MD     Patient information was obtained from patient and ER records.     Subjective:     Principal Problem:Shortness of breath    Chief Complaint:   Chief Complaint   Patient presents with    Shortness of Breath     Pt reports SOB, dry cough, and midsternal chest pain since "all summer long". Pt denies seeing physcian for problem. Pt 86% O2 sats in triage. Pt denies fever, abdominal pain, N/V.     Chest Pain        HPI: 47 yo F w/ PMH of Bipolar disorder, depression, and HTN who presents to the ED complaining of shortness of breath and worsening BLE edema. Patient shares that she has felt short of breath and has noticed worsening edema for the past 6-months. She was bringing her son to the ED to be evaluated when she was unable to get up a flight of stairs. She felt winded and the [facility] ED personelle suggested she be evaluated in the ED. Reports orthopnea and sleeps in a chair. She denies chest pain. In the ED, CXR demonstrated bilateral pulmonary edema and vascular congestion. Ddimer was elevated. CTA was negative for PE but confirmed pulmonary edema w/ atelectasis. Patient has no previous cardiac history. Shares that she has been previously evaluated for SNOW but was never placed on bipap. Not on O2 at home. Patient reports a smoking history; smokes "1 carton of cigarettes 1-2 times per month." Complains of increased urinary frequency. Denies fever, chills, chest pain, abdominal pain, nausea, vomiting, dysuria, and hematuria.      Past Medical History:   Diagnosis Date    Bipolar disorder     Depression     Headache(784.0)     History of psychiatric care     History of psychiatric hospitalization     HTN " (hypertension)     Hypertension     Sonja     Polysubstance dependence 2015    Suicide attempt     Therapy        Past Surgical History:   Procedure Laterality Date     SECTION      CHOLECYSTECTOMY      CYST REMOVAL      on ovary, laproscopic    LAPAROSCOPIC CHOLECYSTECTOMY N/A 2016    Performed by Stevenson Vail MD at [facility] OR [address] [place] [address]    SINUS SURGERY      TONSILLECTOMY         Review of patient's allergies indicates:   Allergen Reactions    Latex, natural rubber        No current facility-administered medications on file prior to encounter.      Current Outpatient Medications on File Prior to Encounter   Medication Sig    divalproex (DEPAKOTE) 125 MG EC tablet Take 125 mg by mouth 3 (three) times daily.    risperiDONE (RISPERDAL) 1 MG tablet Take 1 mg by mouth once daily.     Family History     Problem Relation (Age of Onset)    Diabetes Father    Hypertension Mother        Tobacco Use    Smoking status: Never Smoker    Smokeless tobacco: Never Used   Substance and Sexual Activity    Alcohol use: No    Drug use: No    Sexual activity: Yes     Partners: Male     Birth control/protection: Other-see comments     Review of Systems   Constitutional: Negative for chills and fever.   HENT: Negative for congestion.    Eyes: Negative for visual disturbance.   Respiratory: Positive for shortness of breath. Negative for cough and wheezing.    Cardiovascular: Positive for leg swelling. Negative for chest pain and palpitations.   Gastrointestinal: Negative for abdominal pain, nausea and vomiting.   Genitourinary: Positive for frequency. Negative for difficulty urinating, dysuria and hematuria.   Skin: Positive for color change.   Neurological: Positive for headaches. Negative for dizziness.     Objective:     Vital Signs (Most Recent):  Temp: 98.7 °F (37.1 °C) (10/25/18 2208)  Pulse: 94 (10/26/18 0459)  Resp: 14 (10/25/18 2302)  BP: (!) 142/86 (10/26/18 0131)  SpO2: (!) 92 % (10/26/18  0459) Vital Signs (24h Range):  Temp:  [98.7 °F (37.1 °C)] 98.7 °F (37.1 °C)  Pulse:  [87-94] 94  Resp:  [14-20] 14  SpO2:  [87 %-100 %] 92 %  BP: (142-173)/(70-86) 142/86     Weight: 99.8 kg (220 lb)  Body mass index is 41.57 kg/m².    Physical Exam   Constitutional: She is oriented to person, place, and time. She appears well-developed. No distress.   Morbidly obese woman w/ plethora   HENT:   Head: Normocephalic and atraumatic.   Mouth/Throat: Oropharynx is clear and moist.   Eyes: Conjunctivae and EOM are normal. No scleral icterus.   Neck: Normal range of motion. Neck supple.   Unable to assess JVD due to habitus   Cardiovascular: Normal rate, regular rhythm and normal heart sounds.   Pulmonary/Chest: Effort normal and breath sounds normal. No respiratory distress. She has no wheezes. She has no rales.   Not in respiratory distress but w/ difficulty breathing  Diminished breath sounds   Abdominal: Soft. Bowel sounds are normal. There is no tenderness.   Musculoskeletal: Normal range of motion. She exhibits edema. She exhibits no tenderness.   Neurological: She is alert and oriented to person, place, and time.   Skin: Skin is warm and dry.   Bilateral 3+ pitting edema  Venous insufficiency w/ erythematous skin changes  Bilateral callouses w/ skin break, non-bleeding 1st toes    Psychiatric: She has a normal mood and affect. Her behavior is normal.         CRANIAL NERVES     CN III, IV, VI   Extraocular motions are normal.        Significant Labs:   CBC:   Recent Labs   Lab 10/25/18  2243   WBC 7.69   HGB 13.3   HCT 42.3        CMP:   Recent Labs   Lab 10/25/18  2243      K 4.3      CO2 31*   *   BUN 12   CREATININE 0.8   CALCIUM 9.0   PROT 7.8   ALBUMIN 3.5   BILITOT 0.3   ALKPHOS 101   AST 26   ALT 36   ANIONGAP 7*   EGFRNONAA >60.0     Troponin:   Recent Labs   Lab 10/25/18  2243   TROPONINI <0.006       Significant Imaging: I have reviewed all pertinent imaging results/findings  within the past 24 hours.    Assessment/Plan:     * Shortness of breath    47 yo F w/ PMH of Bipolar disorder, depression, and HTN who presents to the ED complaining of shortness of breath and worsening BLE edema for several months. Also endorses orthopnea. Imaging demonstrated pulmonary edema, atelectasis, and vascular congestion. BNP wnl. Troponin neg. No previous cardiac history. Positive smoking history. Concerning for new onset CHF vs OHS vs SNOW vs COPD. BNP can be falsely negative in obese patients. ABG suggests chronic CO2 retainer: pH 7.309, pCO2 71.3, pO2 75, pHCO3 35.9.   - ECHO w/ color flow, bubble contrast pending  - duonebs q6 PRN  - IV lasix 60 mg q12  - strict I/Os, daily weights  - bipap qhs  - arrange sleep study outpatient  - consider bronchodilators should sxs not improve w/ diuresis, bipap  - PFTs as outpatient     Hypertension, essential    - previously on amlodipine  - patient reports that she stopped taking her medications secondary to hypotension  - monitor        Bipolar 1 disorder, depressed    - stable  - resume home medications         VTE Risk Mitigation (From admission, onward)        Ordered     IP VTE HIGH RISK PATIENT  Once      10/26/18 6399             Radha Ramos MD  Department of Hospital Medicine   [facility]

## 2018-10-26 NOTE — ED TRIAGE NOTES
"Pt reports SOB that has been happening "all summer long", pt states that "I thought it would get better". Reports leg swelling that has gradually getting worse after sitting in the chair, denies raising legs while in chair. Denies N/V/D. States that her   in January and has been stressed out since. Also reports more frequent urination    Patient Identifiers for Shiloh Chatterjee checked and correct  LOC: The patient is awake, alert and aware of environment with an appropriate affect, the patient is oriented x 3 and speaking appropriate.  APPEARANCE: Patient resting comfortably and in no acute distress, patient is clean and well groomed, patient's clothing is properly fastened.  SKIN: The skin is warm and dry, patient has normal skin turgor and moist mucus membranes,no rashes or lesions.Skin Intact , No Breakdown Noted  Musculoskeletal :  Normal range of motion noted. Moves all extremeties well, No swelling or tenderness noted  RESPIRATORY: Airway is open and patent, respirations are spontaneous, patient has a normal effort and rate. Audible expiratory wheezes noted.   CARDIAC: Patient has a normal rate and rhythm, capillary refill < 3 seconds. Edema noted to BLE 2+  ABDOMEN: Soft and non tender to palpation, no distention noted.   PULSES: 2+  And symmetrical in all extremeties  NEUROLOGIC: PERRL. facial expression is symmetrical, patient moving all extremities, normal sensation in all extremities when touched with a finger.The patient is awake, alert and cooperative with a calm affect, patient is aware of environment.    Will continue to monitor        "

## 2018-10-26 NOTE — SUBJECTIVE & OBJECTIVE
Past Medical History:   Diagnosis Date    Bipolar disorder     Depression     Headache(784.0)     History of psychiatric care     History of psychiatric hospitalization     HTN (hypertension)     Hypertension     Sonja     Polysubstance dependence 2015    Suicide attempt     Therapy        Past Surgical History:   Procedure Laterality Date     SECTION      CHOLECYSTECTOMY      CYST REMOVAL      on ovary, laproscopic    LAPAROSCOPIC CHOLECYSTECTOMY N/A 2016    Performed by Stevenson Vail MD at [facility] OR [address] [place] [address]    SINUS SURGERY      TONSILLECTOMY         Review of patient's allergies indicates:   Allergen Reactions    Latex, natural rubber        No current facility-administered medications on file prior to encounter.      Current Outpatient Medications on File Prior to Encounter   Medication Sig    divalproex (DEPAKOTE) 125 MG EC tablet Take 125 mg by mouth 3 (three) times daily.    risperiDONE (RISPERDAL) 1 MG tablet Take 1 mg by mouth once daily.     Family History     Problem Relation (Age of Onset)    Diabetes Father    Hypertension Mother        Tobacco Use    Smoking status: Never Smoker    Smokeless tobacco: Never Used   Substance and Sexual Activity    Alcohol use: No    Drug use: No    Sexual activity: Yes     Partners: Male     Birth control/protection: Other-see comments     Review of Systems   Constitutional: Negative for chills and fever.   HENT: Negative for congestion.    Eyes: Negative for visual disturbance.   Respiratory: Positive for shortness of breath. Negative for cough and wheezing.    Cardiovascular: Positive for leg swelling. Negative for chest pain and palpitations.   Gastrointestinal: Negative for abdominal pain, nausea and vomiting.   Genitourinary: Positive for frequency. Negative for difficulty urinating, dysuria and hematuria.   Skin: Positive for color change.   Neurological: Positive for headaches. Negative for dizziness.     Objective:      Vital Signs (Most Recent):  Temp: 98.7 °F (37.1 °C) (10/25/18 2208)  Pulse: 94 (10/26/18 0459)  Resp: 14 (10/25/18 2302)  BP: (!) 142/86 (10/26/18 0131)  SpO2: (!) 92 % (10/26/18 0459) Vital Signs (24h Range):  Temp:  [98.7 °F (37.1 °C)] 98.7 °F (37.1 °C)  Pulse:  [87-94] 94  Resp:  [14-20] 14  SpO2:  [87 %-100 %] 92 %  BP: (142-173)/(70-86) 142/86     Weight: 99.8 kg (220 lb)  Body mass index is 41.57 kg/m².    Physical Exam   Constitutional: She is oriented to person, place, and time. She appears well-developed. No distress.   Morbidly obese woman w/ plethora   HENT:   Head: Normocephalic and atraumatic.   Mouth/Throat: Oropharynx is clear and moist.   Eyes: Conjunctivae and EOM are normal. No scleral icterus.   Neck: Normal range of motion. Neck supple.   Unable to assess JVD due to habitus   Cardiovascular: Normal rate, regular rhythm and normal heart sounds.   Pulmonary/Chest: Effort normal and breath sounds normal. No respiratory distress. She has no wheezes. She has no rales.   Not in respiratory distress but w/ difficulty breathing  Diminished breath sounds   Abdominal: Soft. Bowel sounds are normal. There is no tenderness.   Musculoskeletal: Normal range of motion. She exhibits edema. She exhibits no tenderness.   Neurological: She is alert and oriented to person, place, and time.   Skin: Skin is warm and dry.   Bilateral 3+ pitting edema  Venous insufficiency w/ erythematous skin changes  Bilateral callouses w/ skin break, non-bleeding 1st toes    Psychiatric: She has a normal mood and affect. Her behavior is normal.         CRANIAL NERVES     CN III, IV, VI   Extraocular motions are normal.        Significant Labs:   CBC:   Recent Labs   Lab 10/25/18  2243   WBC 7.69   HGB 13.3   HCT 42.3        CMP:   Recent Labs   Lab 10/25/18  2243      K 4.3      CO2 31*   *   BUN 12   CREATININE 0.8   CALCIUM 9.0   PROT 7.8   ALBUMIN 3.5   BILITOT 0.3   ALKPHOS 101   AST 26   ALT 36    ANIONGAP 7*   EGFRNONAA >60.0     Troponin:   Recent Labs   Lab 10/25/18  2243   TROPONINI <0.006       Significant Imaging: I have reviewed all pertinent imaging results/findings within the past 24 hours.

## 2018-10-26 NOTE — PROVIDER PROGRESS NOTES - EMERGENCY DEPT.
Encounter Date: 10/25/2018    ED Physician Progress Notes         EKG - STEMI Decision  Initial Reading: No STEMI present.

## 2018-10-26 NOTE — HPI
"49 yo F w/ PMH of Bipolar disorder, depression, and HTN who presents to the ED complaining of shortness of breath and worsening BLE edema. Patient shares that she has felt short of breath and has noticed worsening edema for the past 6-months. She was bringing her son to the ED to be evaluated when she was unable to get up a flight of stairs. She felt winded and the [facility] ED personelle suggested she be evaluated in the ED. Reports orthopnea and sleeps in a chair. She denies chest pain. In the ED, CXR demonstrated bilateral pulmonary edema and vascular congestion. Ddimer was elevated. CTA was negative for PE but confirmed pulmonary edema w/ atelectasis. Patient has no previous cardiac history. Shares that she has been previously evaluated for SNOW but was never placed on bipap. Not on O2 at home. Patient reports a smoking history; smokes "1 carton of cigarettes 1-2 times per month." Complains of increased urinary frequency. Denies fever, chills, chest pain, abdominal pain, nausea, vomiting, dysuria, and hematuria.    "

## 2018-10-26 NOTE — ED NOTES
Pt has come in SOB since her  passed away in January. Pt has also noted swelling in her lower extremities. Triage reports sats in the 80s on room air. Pt reports that her PCP isn't doing anything about the SOB and swelling, he just keeps telling her to lose weight. Pt only came in today because she had to bring her child in. Pt SOB increases with exertion and is relieved by oxygen therapy. Pt has normal work of breathing and adequate O2 sats at this time. RT abs with breathing tx. Will give ordered furosemide when breathing tx are complete.

## 2018-10-26 NOTE — PLAN OF CARE
10/26/18 1130   Discharge Assessment   Assessment Type Discharge Planning Assessment   Confirmed/corrected address and phone number on facesheet? Yes   Assessment information obtained from? Patient;Medical Record;Other  (MIL at bedside)   Expected Length of Stay (days) 3   Communicated expected length of stay with patient/caregiver yes   Prior to hospitilization cognitive status: Alert/Oriented   Prior to hospitalization functional status: Independent   Current cognitive status: Alert/Oriented   Current Functional Status: Independent   Lives With child(ru), dependent  (15 y/o son)   Able to Return to Prior Arrangements yes   Is patient able to care for self after discharge? Yes   Who are your caregiver(s) and their phone number(s)? self care   Patient's perception of discharge disposition home or selfcare   Readmission Within The Last 30 Days no previous admission in last 30 days   Patient currently being followed by outpatient case management? No   Patient currently receives any other outside agency services? No   Equipment Currently Used at Home none   Do you have any problems affording any of your prescribed medications? No   Is the patient taking medications as prescribed? yes   Does the patient have transportation home? Yes   Transportation Available family or friend will provide   Does the patient receive services at the Coumadin Clinic? No   Discharge Plan A Home with family   Patient/Family In Agreement With Plan yes

## 2018-10-27 VITALS
OXYGEN SATURATION: 94 % | DIASTOLIC BLOOD PRESSURE: 81 MMHG | TEMPERATURE: 98 F | SYSTOLIC BLOOD PRESSURE: 139 MMHG | HEART RATE: 81 BPM | HEIGHT: 61 IN | RESPIRATION RATE: 17 BRPM | BODY MASS INDEX: 52.95 KG/M2 | WEIGHT: 280.44 LBS

## 2018-10-27 LAB
ANION GAP SERPL CALC-SCNC: 6 MMOL/L
BUN SERPL-MCNC: 16 MG/DL
CALCIUM SERPL-MCNC: 9.3 MG/DL
CHLORIDE SERPL-SCNC: 97 MMOL/L
CO2 SERPL-SCNC: 37 MMOL/L
CREAT SERPL-MCNC: 0.7 MG/DL
EST. GFR  (AFRICAN AMERICAN): >60 ML/MIN/1.73 M^2
EST. GFR  (NON AFRICAN AMERICAN): >60 ML/MIN/1.73 M^2
GLUCOSE SERPL-MCNC: 108 MG/DL
MAGNESIUM SERPL-MCNC: 1.8 MG/DL
POTASSIUM SERPL-SCNC: 4.4 MMOL/L
SODIUM SERPL-SCNC: 140 MMOL/L

## 2018-10-27 PROCEDURE — 25000003 PHARM REV CODE 250: Performed by: INTERNAL MEDICINE

## 2018-10-27 PROCEDURE — 25000003 PHARM REV CODE 250: Performed by: STUDENT IN AN ORGANIZED HEALTH CARE EDUCATION/TRAINING PROGRAM

## 2018-10-27 PROCEDURE — 99238 HOSP IP/OBS DSCHRG MGMT 30/<: CPT | Mod: ,,, | Performed by: INTERNAL MEDICINE

## 2018-10-27 PROCEDURE — 80048 BASIC METABOLIC PNL TOTAL CA: CPT

## 2018-10-27 PROCEDURE — 36415 COLL VENOUS BLD VENIPUNCTURE: CPT

## 2018-10-27 PROCEDURE — 94660 CPAP INITIATION&MGMT: CPT

## 2018-10-27 PROCEDURE — 83735 ASSAY OF MAGNESIUM: CPT

## 2018-10-27 PROCEDURE — 63600175 PHARM REV CODE 636 W HCPCS: Performed by: STUDENT IN AN ORGANIZED HEALTH CARE EDUCATION/TRAINING PROGRAM

## 2018-10-27 PROCEDURE — 99900035 HC TECH TIME PER 15 MIN (STAT)

## 2018-10-27 RX ORDER — IPRATROPIUM BROMIDE AND ALBUTEROL SULFATE 2.5; .5 MG/3ML; MG/3ML
3 SOLUTION RESPIRATORY (INHALATION) EVERY 6 HOURS PRN
Qty: 1 BOX | Refills: 0 | Status: SHIPPED | OUTPATIENT
Start: 2018-10-27 | End: 2019-10-27

## 2018-10-27 RX ORDER — LOSARTAN POTASSIUM 50 MG/1
50 TABLET ORAL DAILY
Qty: 90 TABLET | Refills: 3 | Status: SHIPPED | OUTPATIENT
Start: 2018-10-27 | End: 2019-10-27

## 2018-10-27 RX ORDER — DIVALPROEX SODIUM 125 MG/1
125 TABLET, DELAYED RELEASE ORAL 3 TIMES DAILY
Qty: 90 TABLET | Refills: 11 | Status: SHIPPED | OUTPATIENT
Start: 2018-10-27 | End: 2019-10-27

## 2018-10-27 RX ORDER — FUROSEMIDE 40 MG/1
40 TABLET ORAL 2 TIMES DAILY
Qty: 60 TABLET | Refills: 11 | Status: SHIPPED | OUTPATIENT
Start: 2018-10-27 | End: 2018-10-27 | Stop reason: SDUPTHER

## 2018-10-27 RX ORDER — FUROSEMIDE 40 MG/1
40 TABLET ORAL DAILY
Qty: 30 TABLET | Refills: 11 | Status: SHIPPED | OUTPATIENT
Start: 2018-10-27 | End: 2019-10-27

## 2018-10-27 RX ADMIN — RISPERIDONE 1 MG: 1 TABLET ORAL at 09:10

## 2018-10-27 RX ADMIN — DIVALPROEX SODIUM 125 MG: 125 TABLET, DELAYED RELEASE ORAL at 02:10

## 2018-10-27 RX ADMIN — FUROSEMIDE 60 MG: 10 INJECTION, SOLUTION INTRAMUSCULAR; INTRAVENOUS at 09:10

## 2018-10-27 RX ADMIN — LOSARTAN POTASSIUM 50 MG: 50 TABLET ORAL at 09:10

## 2018-10-27 RX ADMIN — DIVALPROEX SODIUM 125 MG: 125 TABLET, DELAYED RELEASE ORAL at 09:10

## 2018-10-27 NOTE — HOSPITAL COURSE
Shiloh Chatterjee was admitted to [facility] [place] 10/25 with complaints of Sob and worsening lower extremity edema.  In the ED, CXR demonstrated bilateral pulmonary edema and vascular congestion. Ddimer was elevated. CTA was negative for PE but confirmed pulmonary edema w/ atelectasis. Pt was started on IV lasix for dieresis and a sleep study was scheduled as an outpatient for evaluation of likely obstructive sleep apnea. Pt was given a bipap machine and was instructed by respiratory therapists on use and settings. She was discharged in stable condition with PO lasix, a bipap machine and close follow up with the sleep clinic. Home Amlodipine was also switched to Losartan in setting on lower extremity swelling.

## 2018-10-27 NOTE — NURSING
Discharge instructions given to patient on medications, diet, activity restrictions, follow up visits with understanding. IV discontinued. Patient without complaints. Patient discharged via ambulatory with son at side per patient request.

## 2018-10-27 NOTE — DISCHARGE SUMMARY
"[facility] Hospital Medicine  Discharge Summary      Patient Name: Shiloh Chatterjee  MRN: [id number]  Admission Date: 10/25/2018  Hospital Length of Stay: 1 days  Discharge Date and Time:  10/27/2018 12:00 PM  Attending Physician: Jhonny Short MD   Discharging Provider: Landry Jarrett MD  Primary Care Provider: Primary Doctor [facility] Medicine Team: [facility]OSP MED 3 Landry Jarrett MD    HPI:   49 yo F w/ PMH of Bipolar disorder, depression, and HTN who presents to the ED complaining of shortness of breath and worsening BLE edema. Patient shares that she has felt short of breath and has noticed worsening edema for the past 6-months. She was bringing her son to the ED to be evaluated when she was unable to get up a flight of stairs. She felt winded and the [facility] ED personelle suggested she be evaluated in the ED. Reports orthopnea and sleeps in a chair. She denies chest pain. In the ED, CXR demonstrated bilateral pulmonary edema and vascular congestion. Ddimer was elevated. CTA was negative for PE but confirmed pulmonary edema w/ atelectasis. Patient has no previous cardiac history. Shares that she has been previously evaluated for SNOW but was never placed on bipap. Not on O2 at home. Patient reports a smoking history; smokes "1 carton of cigarettes 1-2 times per month." Complains of increased urinary frequency. Denies fever, chills, chest pain, abdominal pain, nausea, vomiting, dysuria, and hematuria.      * No surgery found *      Hospital Course:   Shiloh Chatterjee was admitted to [facility] on 10/25 with complaints of Sob and worsening lower extremity edema.  In the ED, CXR demonstrated bilateral pulmonary edema and vascular congestion. Ddimer was elevated. CTA was negative for PE but confirmed pulmonary edema w/ atelectasis. Pt was started on IV lasix for dieresis and a sleep study was scheduled as an outpatient for evaluation of likely obstructive sleep apnea. Pt was given a bipap machine and " "was instructed by respiratory therapists on use and settings. She was discharged in stable condition with PO lasix, a bipap machine and close follow up with the sleep clinic. Home Amlodipine was also switched to Losartan in setting on lower extremity swelling.      BP (!) 140/66 (BP Location: Left arm, Patient Position: Lying)   Pulse 88   Temp 98 °F (36.7 °C) (Oral)   Resp 17   Ht 5' 1" (1.549 m)   Wt 127.2 kg (280 lb 6.8 oz)   SpO2 (!) 94%   Breastfeeding? No   BMI 52.99 kg/m²     Physical Exam   Constitutional: She is oriented to person, place, and time and well-developed, well-nourished, and in no distress. No distress.   HENT:   Head: Normocephalic and atraumatic.   Eyes: Conjunctivae are normal. Pupils are equal, round, and reactive to light.   Neck: Normal range of motion. Neck supple.   Cardiovascular: Normal rate, regular rhythm and intact distal pulses.   Pulmonary/Chest: Effort normal. No respiratory distress. She has no wheezes.   Slight wheeze in lung base   Abdominal: Soft. Bowel sounds are normal. There is no tenderness.   Musculoskeletal: Normal range of motion.   Neurological: She is alert and oriented to person, place, and time. Gait normal. GCS score is 15.   Skin: Skin is warm and dry. She is not diaphoretic.   Psychiatric: Mood, memory, affect and judgment normal.   Nursing note and vitals reviewed.      Consults:     * Acute on chronic respiratory failure with hypoxia and hypercapnia    - CXR positive for pulmonary congestion.   - IV Lasix for dieresis, will dc on home PO lasix for continued dieresis  - Bipap machine given, interactions on use provided by respiratory therapy   - Follow up with sleep clinic on 11/8 for SNOW     Hypertension, essential    - previously on amlodipine, DC 2/2 lower extremity swelling, start Losartan  - Po lasix  - patient reports that she stopped taking her medications secondary to hypotension  - monitor        Bipolar 1 disorder, depressed    - stable  - " "resume home medications         Final Active Diagnoses:    Diagnosis Date Noted POA    PRINCIPAL PROBLEM:  Acute on chronic respiratory failure with hypoxia and hypercapnia [J96.21, J96.22] 10/26/2018 Yes    Acute pulmonary edema [J81.0] 10/26/2018 Yes    Hypercapnia [R06.89] 10/26/2018 Yes    Obesity hypoventilation syndrome [E66.2] 10/26/2018 Yes    Hypertension, essential [I10] 07/06/2015 Yes    Bipolar 1 disorder, depressed [F31.9] 07/01/2015 Yes    Morbid obesity [E66.01] 03/26/2014 Yes    Panic disorder with agoraphobia [F40.01] 11/07/2013 Yes    FELIBERTO (generalized anxiety disorder) [F41.1] 11/07/2013 Yes      Problems Resolved During this Admission:       Discharged Condition: good    Disposition: Home or Self Care    Follow Up:  Follow-up Information     Rita Vera NP On 11/8/2018.    Specialties:  Neurology, Internal Medicine, Sleep Medicine  Why:  1:00pm at [address] in [place] for sleep clinic appointment  Contact information:  [address] [place] [id number]  [phone]                 Patient Instructions:      VENTILATOR FOR HOME USE     Order Specific Question Answer Comments   Height: 5' 1" (1.549 m)    Weight: 131.2 kg (289 lb 3.9 oz)    Does patient have medical equipment at home? none    Length of need (1-99 months): 99    Interface needed: Nasal mask    Mode: CPAP    PEEP - EPAP 10.0 CM/H20    Humidifier needed? Yes    Vendor: Other (use comments)    Expected Date of Delivery: 10/26/2018      BIPAP FOR HOME USE     Order Specific Question Answer Comments   Type: BiPap    BiPap setting (cmH20) Inspiratory: 10    BiPap setting (cmH20) Expiratory: 5    Length of need (1-99 months): 99    Humidification: Heated    Type of mask: FFM    Accessories needed: Humidifier chamber    Accessories needed: Headgear    Accessories needed: Tubing    Accessories needed: Chin strap    BiPap supply refills: 12    Vendor: [facility]    Expected Date of Delivery: 10/26/2018  "     Ambulatory consult to Sleep Disorders   Referral Priority: Routine Referral Type: Consultation   Requested Specialty: Sleep Medicine   Number of Visits Requested: 1     Ambulatory Referral to IM Priority Clinic   Referral Priority: Routine Referral Type: Consultation   Referral Reason: Specialty Services Required   Number of Visits Requested: 1     Diet Cardiac     Activity as tolerated       Significant Diagnostic Studies: Labs:   BMP:   Recent Labs   Lab 10/25/18  2243 10/27/18  0632   * 108    140   K 4.3 4.4    97   CO2 31* 37*   BUN 12 16   CREATININE 0.8 0.7   CALCIUM 9.0 9.3   MG  --  1.8   , CMP   Recent Labs   Lab 10/25/18  2243 10/27/18  0632    140   K 4.3 4.4    97   CO2 31* 37*   * 108   BUN 12 16   CREATININE 0.8 0.7   CALCIUM 9.0 9.3   PROT 7.8  --    ALBUMIN 3.5  --    BILITOT 0.3  --    ALKPHOS 101  --    AST 26  --    ALT 36  --    ANIONGAP 7* 6*   ESTGFRAFRICA >60.0 >60.0   EGFRNONAA >60.0 >60.0   , CBC   Recent Labs   Lab 10/25/18  2243   WBC 7.69   HGB 13.3   HCT 42.3      , INR   Lab Results   Component Value Date    INR 1.0 07/31/2016    INR 0.9 07/08/2009   , Lipid Panel No results found for: CHOL, HDL, LDLCALC, TRIG, CHOLHDL, Troponin   Recent Labs   Lab 10/25/18  2243   TROPONINI <0.006   , A1C:   Recent Labs   Lab 10/26/18  0629   HGBA1C 5.2    and All labs within the past 24 hours have been reviewed    Pending Diagnostic Studies:     None         Medications:  Reconciled Home Medications:      Medication List      START taking these medications    albuterol 90 mcg/actuation inhaler  Commonly known as:  PROVENTIL/VENTOLIN HFA  Inhale 1-2 puffs into the lungs every 6 (six) hours as needed for Wheezing. Rescue     albuterol-ipratropium 2.5 mg-0.5 mg/3 mL nebulizer solution  Commonly known as:  DUO-NEB  Take 3 mLs by nebulization every 6 (six) hours as needed for Wheezing. Rescue     furosemide 40 MG tablet  Commonly known as:  LASIX  Take 1  tablet (40 mg total) by mouth once daily.     losartan 50 MG tablet  Commonly known as:  COZAAR  Take 1 tablet (50 mg total) by mouth once daily.     predniSONE 20 MG tablet  Commonly known as:  DELTASONE  Take 2 tablets (40 mg total) by mouth once daily. for 5 days        CHANGE how you take these medications    gabapentin 100 MG capsule  Commonly known as:  NEURONTIN  Take 600 mg by mouth 3 (three) times daily.  What changed:  Another medication with the same name was removed. Continue taking this medication, and follow the directions you see here.     ibuprofen 600 MG tablet  Commonly known as:  ADVIL,MOTRIN  Take 600 mg by mouth every 6 (six) hours as needed for Pain.  What changed:  Another medication with the same name was removed. Continue taking this medication, and follow the directions you see here.        CONTINUE taking these medications    acetaminophen 500 MG tablet  Commonly known as:  TYLENOL  Take 1,000 mg by mouth daily as needed for Pain (headache).     divalproex 125 MG EC tablet  Commonly known as:  DEPAKOTE  Take 1 tablet (125 mg total) by mouth 3 (three) times daily.     DULoxetine 30 MG capsule  Commonly known as:  CYMBALTA  Take 30 mg by mouth once daily.     RisperDAL 1 MG tablet  Generic drug:  risperiDONE  Take 1 mg by mouth once daily.     risperiDONE microspheres 37.5 mg/2 mL injection  Commonly known as:  RISPERDAL CONSTA  Inject 37.5 mg into the muscle every 14 (fourteen) days.        STOP taking these medications    amLODIPine 5 MG tablet  Commonly known as:  NORVASC     docusate sodium 100 MG capsule  Commonly known as:  COLACE     omeprazole 20 MG capsule  Commonly known as:  PRILOSEC            Indwelling Lines/Drains at time of discharge:   Lines/Drains/Airways          None          Time spent on the discharge of patient: 15 minutes  Patient was seen and examined on the date of discharge and determined to be suitable for discharge.         Landry Jarrett MD  Department of  Hospital Medicine [facility]

## 2018-10-27 NOTE — PLAN OF CARE
CM spoke with RT Kayla and requested BiPap set up. BiPap set up and explained to patient. Patient understands need to keep appt at sleep clinic. No further needs identified. Shani RN notified of pending discharge.

## 2018-10-27 NOTE — PLAN OF CARE
Problem: Patient Care Overview  Goal: Plan of Care Review  Outcome: Ongoing (interventions implemented as appropriate)  Patient AAOx4. Vitals remain stable. Patient remains free from falls/injury throughout shift. Continuous cardiac monitoring in place per MD orders. Oxygen titrated prn. No complaints of pain this shift. Will continue to monitor.

## 2018-10-30 ENCOUNTER — PATIENT OUTREACH (OUTPATIENT)
Dept: ADMINISTRATIVE | Facility: CLINIC | Age: 48
End: 2018-10-30

## 2018-10-30 NOTE — PATIENT INSTRUCTIONS

## 2018-10-31 ENCOUNTER — TELEPHONE (OUTPATIENT)
Dept: INTERNAL MEDICINE | Facility: CLINIC | Age: 48
End: 2018-10-31

## 2018-10-31 NOTE — PROGRESS NOTES
No need to take Prednisone.  Patient has sleep apnea.    Sent Epic portal message.    Called patient but her mail box is full and cannot take messages.

## 2018-10-31 NOTE — TELEPHONE ENCOUNTER
----- Message from Niurka Jacobo LPN sent at 10/30/2018  4:47 PM CDT -----  Spoke with patient during TCC call, states that she did not receive prednisone 20 mg prescription. It seems that prescription was printed but patient did not get printed script. Please contact patient to discuss.      Respectfully,  Niurka Jacobo LPN  [facility]    [email or web address]       Please do not reply to this message, as this inbox is not routinely monitored.

## 2018-11-07 ENCOUNTER — OFFICE VISIT (OUTPATIENT)
Dept: INTERNAL MEDICINE | Facility: CLINIC | Age: 48
End: 2018-11-07
Payer: COMMERCIAL

## 2018-11-07 VITALS
SYSTOLIC BLOOD PRESSURE: 132 MMHG | DIASTOLIC BLOOD PRESSURE: 78 MMHG | OXYGEN SATURATION: 94 % | WEIGHT: 273.81 LBS | TEMPERATURE: 99 F | BODY MASS INDEX: 51.7 KG/M2 | HEART RATE: 91 BPM | HEIGHT: 61 IN

## 2018-11-07 DIAGNOSIS — F31.9 BIPOLAR 1 DISORDER, DEPRESSED: ICD-10-CM

## 2018-11-07 DIAGNOSIS — I51.7 CARDIOMEGALY: ICD-10-CM

## 2018-11-07 DIAGNOSIS — H53.9 VISION DISTURBANCE: ICD-10-CM

## 2018-11-07 DIAGNOSIS — F41.1 GAD (GENERALIZED ANXIETY DISORDER): ICD-10-CM

## 2018-11-07 DIAGNOSIS — Z76.89 ENCOUNTER TO ESTABLISH CARE: Primary | ICD-10-CM

## 2018-11-07 DIAGNOSIS — I10 HYPERTENSION, ESSENTIAL: ICD-10-CM

## 2018-11-07 DIAGNOSIS — F32.A DEPRESSION, UNSPECIFIED DEPRESSION TYPE: ICD-10-CM

## 2018-11-07 DIAGNOSIS — G47.33 OSA (OBSTRUCTIVE SLEEP APNEA): ICD-10-CM

## 2018-11-07 PROCEDURE — 99999 PR PBB SHADOW E&M-EST. PATIENT-LVL IV: CPT | Mod: PBBFAC,,, | Performed by: NURSE PRACTITIONER

## 2018-11-07 PROCEDURE — 99214 OFFICE O/P EST MOD 30 MIN: CPT | Mod: S$GLB,,, | Performed by: NURSE PRACTITIONER

## 2018-11-07 PROCEDURE — 3075F SYST BP GE 130 - 139MM HG: CPT | Mod: CPTII,S$GLB,, | Performed by: NURSE PRACTITIONER

## 2018-11-07 PROCEDURE — 3078F DIAST BP <80 MM HG: CPT | Mod: CPTII,S$GLB,, | Performed by: NURSE PRACTITIONER

## 2018-11-07 PROCEDURE — 3008F BODY MASS INDEX DOCD: CPT | Mod: CPTII,S$GLB,, | Performed by: NURSE PRACTITIONER

## 2018-11-07 RX ORDER — PREDNISONE 20 MG/1
20 TABLET ORAL 2 TIMES DAILY
Qty: 10 TABLET | Refills: 0 | Status: SHIPPED | OUTPATIENT
Start: 2018-11-07 | End: 2018-11-12

## 2018-11-07 NOTE — PROGRESS NOTES
"Subjective:       Patient ID: Shiloh Chatterjee is a 48 y.o. female.    Chief Complaint: Annual Exam (medication)    HPI:  47 yo female that presents to clinic today to establish care.    Medical history significant for anxiety, depression, bipolar disorder, HTN sleep apnea.    Patient is followed by outside psychiatrist for anxiety and bipolar disorder.    Was seen in ED on 10/25/18 with complaints of SOB, mid sternal chest pain, and bilateral LE edema.  States that these symptoms started a "few months ago."  Patient stated that SOB was gradually progressive and worsens when she lies down so she has been sleeping in a chair sitting up.  Patient was admitted inpatient form 10/25/18-10/27/18.  Chest xray showed cardiomegaly with central vascular congestion and bilateral edema.  Echo showed normal systolic and diastolic function.  Calculated EF of 60% with trivial mitral, tricuspid and pulmonic regurgitation.    Since discharge, patient states that she is doing better.  States that she is scheduled to see sleep medicine tomorrow.  States that she is now on daily lasix.  States that appetite and energy level are getting back to normal.    States that she still gets some SOB with deep inspiration and when she "over exerts herself."       States that she is also concerned about some blurred vision with "reading things."    Review of Systems   Constitutional: Positive for fatigue. Negative for activity change, appetite change and fever.   HENT: Negative for congestion, ear pain, postnasal drip, rhinorrhea, sinus pressure, sinus pain and sore throat.    Eyes: Positive for photophobia and visual disturbance.   Respiratory: Positive for shortness of breath. Negative for apnea, chest tightness, wheezing and stridor.    Cardiovascular: Positive for leg swelling. Negative for chest pain and palpitations.   Gastrointestinal: Negative for abdominal distention, abdominal pain, constipation, diarrhea, nausea and vomiting. "   Genitourinary: Negative for difficulty urinating, dysuria, frequency, hematuria and urgency.   Musculoskeletal: Negative for arthralgias, back pain, myalgias, neck pain and neck stiffness.   Skin: Negative for color change and rash.   Neurological: Negative for dizziness, light-headedness, numbness and headaches.   Psychiatric/Behavioral: Negative for behavioral problems.       Objective:      Physical Exam   Constitutional: She is oriented to person, place, and time. She appears well-developed and well-nourished. No distress.   HENT:   Head: Normocephalic and atraumatic.   Right Ear: External ear normal.   Left Ear: External ear normal.   Mouth/Throat: Oropharynx is clear and moist.   Eyes: Conjunctivae are normal. Pupils are equal, round, and reactive to light.   Neck: Normal range of motion. Neck supple. No thyromegaly present.   Cardiovascular: Normal rate, regular rhythm, normal heart sounds and intact distal pulses.   No murmur heard.  Pulmonary/Chest: Effort normal and breath sounds normal. No stridor. No respiratory distress. She has no wheezes. She has no rales.   Abdominal: Soft. Bowel sounds are normal. She exhibits no distension and no mass. There is no tenderness.   Musculoskeletal: She exhibits edema (2+ pitting ble). She exhibits no tenderness.   Lymphadenopathy:     She has no cervical adenopathy.   Neurological: She is alert and oriented to person, place, and time. No cranial nerve deficit or sensory deficit.   Skin: Skin is warm and dry. No rash noted. No erythema.   Psychiatric: Her behavior is normal.       Assessment:       1. Encounter to establish care    2. Hypertension, essential    3. SNOW (obstructive sleep apnea)    4. Depression, unspecified depression type    5. Vision disturbance    6. Cardiomegaly    7. Bipolar 1 disorder, depressed    8. FELIBERTO (generalized anxiety disorder)        Plan:     1.  Encounter to establish care  -Vitals are stable.  -Will check a cbc, cmp, tsh and lipid  panel.  -Sheet given with PCPs accepting new patients.  Patient will need to choose a new pcp.    2. HTN  -Blood pressure is well controlled in clinic today  -Continue on current dose of losartan.    3.  SNOW  -Scheduled to see sleep medicine tomorrow for further evaluation.  -Encouraged to keep appointment.    4.  Depression/anxiety/bipolar diosrder  -Patient was seeing outside psychiatrist but would like to be seen in [facility].  -Referral for psychiatry placed.  -Continue current medications.    5.  Cardiomegaly and edema.  -Continue on lasix therapy.  -Will refer to cardiology for further evaluation and management of suspected heart failure.    6.  Vision disturbance  -Will refer to optometry to have vision tested.

## 2018-11-08 ENCOUNTER — OFFICE VISIT (OUTPATIENT)
Dept: SLEEP MEDICINE | Facility: CLINIC | Age: 48
End: 2018-11-08
Payer: COMMERCIAL

## 2018-11-08 VITALS
BODY MASS INDEX: 52.09 KG/M2 | HEIGHT: 61 IN | WEIGHT: 275.88 LBS | DIASTOLIC BLOOD PRESSURE: 93 MMHG | HEART RATE: 98 BPM | SYSTOLIC BLOOD PRESSURE: 153 MMHG

## 2018-11-08 DIAGNOSIS — J96.21 ACUTE ON CHRONIC RESPIRATORY FAILURE WITH HYPOXIA AND HYPERCAPNIA: ICD-10-CM

## 2018-11-08 DIAGNOSIS — J96.22 ACUTE ON CHRONIC RESPIRATORY FAILURE WITH HYPOXIA AND HYPERCAPNIA: ICD-10-CM

## 2018-11-08 DIAGNOSIS — E66.01 MORBID OBESITY: ICD-10-CM

## 2018-11-08 DIAGNOSIS — G47.30 SLEEP APNEA, UNSPECIFIED TYPE: Primary | ICD-10-CM

## 2018-11-08 PROCEDURE — 99999 PR PBB SHADOW E&M-EST. PATIENT-LVL IV: CPT | Mod: PBBFAC,,, | Performed by: NURSE PRACTITIONER

## 2018-11-08 PROCEDURE — 3008F BODY MASS INDEX DOCD: CPT | Mod: CPTII,S$GLB,, | Performed by: NURSE PRACTITIONER

## 2018-11-08 PROCEDURE — 3080F DIAST BP >= 90 MM HG: CPT | Mod: CPTII,S$GLB,, | Performed by: NURSE PRACTITIONER

## 2018-11-08 PROCEDURE — 99203 OFFICE O/P NEW LOW 30 MIN: CPT | Mod: S$GLB,,, | Performed by: NURSE PRACTITIONER

## 2018-11-08 PROCEDURE — 3077F SYST BP >= 140 MM HG: CPT | Mod: CPTII,S$GLB,, | Performed by: NURSE PRACTITIONER

## 2018-11-08 NOTE — PROGRESS NOTES
"Shiloh Chatterjee  was seen as a new patient, referred by Dr. Jhonny Short for the evaluation of obstructive sleep apnea.     CHIEF COMPLAINT: Snoring, excessive daytime sleepiness    HISTORY OF PRESENT ILLNESS: Shiloh Chatterjee a 48 y.o. female presents for the evaluaton of obstructive sleep apnea. She was recently discharged from hospital after acute on chronic resp failure with hypoxia and hypercapnia. She had been short of breath allsummer and would wake up constantly from sleep. She was sent home with bipap machine auto and has been using it nightly, not long since got machine. Having mask leaks. "Feel like I'm 12 y.o again" with using therapy. Snoring and air gasping is resolved. Never had formal PSG. ESS prior to setup was 23. She denies recurrent daytime sleepiness. Sleep is consolidated now!    Interrogation- MAsK FIT 8% affecting ahi 12-13, 2.1h/n. 90% tile 7.3/5cm    Denies symptoms of restless legs or kicking during sleep.       EPWORTH SLEEPINESS SCALE 11/8/2018   Sitting and reading 3   Watching TV 3   Sitting, inactive in a public place (e.g. a theatre or a meeting) 3   As a passenger in a car for an hour without a break 3   Lying down to rest in the afternoon when circumstances permit 3   Sitting and talking to someone 2   Sitting quietly after a lunch without alcohol 3   In a car, while stopped for a few minutes in traffic 3   Total score 23--this is score before BIPAP     Sleep Clinic New Patient 11/8/2018   What time do you go to bed on a week day? (Give a range) 9 to 10 pm   What time do you go to bed on a day off? (Give a range) 1 am   How long does it take you to fall asleep? (Give a range) Just a few minutes   On average, how many times per night do you wake up? 1-10   How long does it take you to fall back into sleep? (Give a range) A few minutes to several hours   What time do you wake up to start your day on a week day? (Give a range) 7 am   What time do you wake up to start your day on a " "day off? (Give a range) 7 am   What time do you get out of bed? (Give a range) 7 am   On average, how many hours do you sleep? 4-6   On average, how many naps do you take per day? 5-6   Rate your sleep quality from 0 to 5 (0-poor, 5-great). 0   Have you experienced:  Weight gain?   How much weight have you lost or gained (in lbs.) in the last year? 50   On average, how many times per night do you go to the bathroom?  1   Have you ever had a sleep study/CPAP machine/surgery for sleep apnea? No   Have you ever had a CPAP machine for sleep apnea? No   Have you ever had surgery for sleep apnea? No     FAMILY HISTORY: No known sleep disorders.     SOCIAL HISTORY:  (since / sleep), homemaker    REVIEW OF SYSTEMS:  Sleep related symptoms as per [facility]  Sleep Clinic ROS  2018   Difficulty breathing through the nose?  Yes   Sore throat or dry mouth in the morning? Yes   Irregular or very fast heart beat?  Yes   Shortness of breath?  Yes   Acid reflux? No   Body aches and pains?  Yes   Morning headaches? Yes   Dizziness? Yes   Mood changes?  Yes   Do you exercise?  No   Do you feel like moving your legs a lot?  Yes         PHYSICAL EXAM:   BP (!) 153/93   Pulse 98   Ht 5' 1" (1.549 m)   Wt 125.1 kg (275 lb 14.4 oz)   BMI 52.13 kg/m²   GENERAL: morbid obese body habitus, well groomed   HEENT: Conjunctivae are non-erythematous; Pupils equal, round, and reactive to light; Nose is symmetrical  SKIN: On face and neck: No abrasions, no rashes, no lesions  RESPIRATORY:  Normal chest expansion and non-labored breathing at rest.   EXTREMITIES: No edema. No clubbing. No cyanosis. Station normal. Gait normal.   NEURO/PSYCH: Oriented to time, place and person. Normal attention span and concentration. Affect is full. Mood is normal.       ASSESSMENT:     Unspecified Sleep Apnea, with symptoms of disruptive snoring, witnessed apneic pauses, un-refreshing disrupted sleep and excessive daytime sleepiness, with medical " comorbidities of chronic respiratory failure with hypoxia and hypercapnia/recently discharged with bipap therapy, morbid obesity. Warrants further investigation for untreated sleep apnea.     PLAN:   1. Polysomnogram, (she was DC'd from hospital on Bipap auto, FFM).    2. Discussed etiology of SNOW and potential ramifications of untreated SNOW, including stroke, heart disease, HTN.  We discussed potential treatment options, which could include weight loss (10-15%), body positioning, continuous positive airway pressure (CPAP-definitive), mandibular advancement splint by dentist, or referral for surgical consideration.   3. The patient was advised to abstain from driving should she feel sleepy or drowsy.   4. Plan [facility] results/plan. Sample F20m provided today, continue autobipap 10/5, min ps 2, max ps 5 until have PSG      Thank you for allowing me the opportunity to participate in the care of your patient

## 2018-11-08 NOTE — LETTER
November 8, 2018      Jhonny Short MD  [address]  [facility] [address]           [facility]  [address] [facility] [address]  Phone: [phone]  Fax: [phone]          Patient: Shiloh Chatterjee   MR Number: [id number]   YOB: 1970   Date of Visit: 11/8/2018       Dear Dr. Jhonny Short:    Thank you for referring Shiloh Chatterjee to me for evaluation. Attached you will find relevant portions of my assessment and plan of care.    If you have questions, please do not hesitate to call me. I look forward to following Shiloh Chatterjee along with you.    Sincerely,    Rita Vera, NP    Enclosure  CC:  No Recipients    If you would like to receive this communication electronically, please contact [email or web address] or [phone] to request more information on [employer] access.    For providers and/or their staff who would like to refer a patient to [facility], please contact us through our one-stop-shop provider referral line, Howard Treviño, at [phone].    If you feel you have received this communication in error or would no longer like to receive these types of communications, please e-mail [email or web address]

## 2018-11-08 NOTE — PATIENT INSTRUCTIONS
Vanessa or Sanjay will contact you to schedule your sleep study. Their number is [phone] (ext 2). The [facility] is located on 7th floor of the [facility].    We will call you when the sleep study results are ready - if you have not heard from us by 2 weeks from the date of the study, please call [phone] (ext 1).    You are advised to abstain from driving should you feel sleepy or drowsy.

## 2019-01-28 PROBLEM — J81.0 ACUTE PULMONARY EDEMA: Status: RESOLVED | Noted: 2018-10-26 | Resolved: 2019-01-28

## 2020-02-13 ENCOUNTER — HOSPITAL ENCOUNTER (EMERGENCY)
Facility: HOSPITAL | Age: 50
Discharge: HOME | End: 2020-02-13
Attending: EMERGENCY MEDICINE | Admitting: EMERGENCY MEDICINE

## 2020-02-13 ENCOUNTER — APPOINTMENT (OUTPATIENT)
Dept: CT IMAGING | Facility: HOSPITAL | Age: 50
End: 2020-02-13

## 2020-02-13 VITALS
OXYGEN SATURATION: 99 % | RESPIRATION RATE: 18 BRPM | HEART RATE: 81 BPM | HEIGHT: 61 IN | SYSTOLIC BLOOD PRESSURE: 131 MMHG | TEMPERATURE: 97.9 F | BODY MASS INDEX: 32.1 KG/M2 | WEIGHT: 170 LBS | DIASTOLIC BLOOD PRESSURE: 77 MMHG

## 2020-02-13 DIAGNOSIS — G44.209 ACUTE NON INTRACTABLE TENSION-TYPE HEADACHE: Primary | ICD-10-CM

## 2020-02-13 PROCEDURE — 70450 CT HEAD/BRAIN W/O DYE: CPT

## 2020-02-13 PROCEDURE — 96375 TX/PRO/DX INJ NEW DRUG ADDON: CPT

## 2020-02-13 PROCEDURE — 25010000002 PROCHLORPERAZINE 10 MG/2ML SOLUTION: Performed by: EMERGENCY MEDICINE

## 2020-02-13 PROCEDURE — 96374 THER/PROPH/DIAG INJ IV PUSH: CPT

## 2020-02-13 PROCEDURE — 70450 CT HEAD/BRAIN W/O DYE: CPT | Performed by: RADIOLOGY

## 2020-02-13 PROCEDURE — 99283 EMERGENCY DEPT VISIT LOW MDM: CPT

## 2020-02-13 PROCEDURE — 25010000002 KETOROLAC TROMETHAMINE PER 15 MG: Performed by: EMERGENCY MEDICINE

## 2020-02-13 RX ORDER — SODIUM CHLORIDE 0.9 % (FLUSH) 0.9 %
10 SYRINGE (ML) INJECTION AS NEEDED
Status: DISCONTINUED | OUTPATIENT
Start: 2020-02-13 | End: 2020-02-13 | Stop reason: HOSPADM

## 2020-02-13 RX ORDER — PROCHLORPERAZINE EDISYLATE 5 MG/ML
5 INJECTION INTRAMUSCULAR; INTRAVENOUS EVERY 6 HOURS PRN
Status: DISCONTINUED | OUTPATIENT
Start: 2020-02-13 | End: 2020-02-13 | Stop reason: HOSPADM

## 2020-02-13 RX ORDER — KETOROLAC TROMETHAMINE 30 MG/ML
15 INJECTION, SOLUTION INTRAMUSCULAR; INTRAVENOUS ONCE
Status: COMPLETED | OUTPATIENT
Start: 2020-02-13 | End: 2020-02-13

## 2020-02-13 RX ADMIN — KETOROLAC TROMETHAMINE 15 MG: 30 INJECTION, SOLUTION INTRAMUSCULAR; INTRAVENOUS at 14:14

## 2020-02-13 RX ADMIN — PROCHLORPERAZINE EDISYLATE 5 MG: 5 INJECTION INTRAMUSCULAR; INTRAVENOUS at 14:14

## 2020-02-13 NOTE — ED PROVIDER NOTES
Subjective   Patient presents to Er with headache which started three days ago.      Headache   Pain location:  R parietal and R temporal  Quality:  Dull  Radiates to:  R neck  Severity currently:  6/10  Severity at highest:  6/10  Onset quality:  Gradual  Duration:  3 days  Progression:  Worsening  Chronicity:  New  Associated symptoms: fatigue and neck pain        Review of Systems   Constitutional: Positive for activity change and fatigue.   Eyes: Negative.    Respiratory: Negative.    Cardiovascular: Negative.    Gastrointestinal: Negative.    Endocrine: Negative.    Genitourinary: Negative.    Musculoskeletal: Positive for neck pain.   Skin: Negative.    Allergic/Immunologic: Negative.    Neurological: Positive for headaches.   Hematological: Negative.    Psychiatric/Behavioral: Negative.        No past medical history on file.    Allergies   Allergen Reactions   • Citalopram Other (See Comments)     Palpitations     • Latex Hives   • Metoprolol Other (See Comments)     Low bp         No past surgical history on file.    No family history on file.    Social History     Socioeconomic History   • Marital status: Single     Spouse name: Not on file   • Number of children: Not on file   • Years of education: Not on file   • Highest education level: Not on file           Objective   Physical Exam   Constitutional: She appears well-developed and well-nourished.   HENT:   Head: Normocephalic.   Eyes: Pupils are equal, round, and reactive to light.   Neck: Normal range of motion.   Cardiovascular: Normal rate.   Pulmonary/Chest: Effort normal.   Abdominal: Soft.   Musculoskeletal: Normal range of motion.   Neurological: She is alert.   Skin: Skin is warm.   Psychiatric: She has a normal mood and affect.   Nursing note and vitals reviewed.      Procedures           ED Course  ED Course as of Feb 14 0118   Thu Feb 13, 2020   1458 Assessed patient who says she feels much better.  She is currently pain free.  Updated that  we are waiting for CT results.      [ATILIO]   6934 IMPRESSION:  No acute intracranial abnormality.   CT Head Without Contrast [ATILIO]      ED Course User Index  [ATILIO] María Elena Goodwin, APRN                                           MDM  Number of Diagnoses or Management Options  Acute non intractable tension-type headache: new and does not require workup     Amount and/or Complexity of Data Reviewed  Tests in the radiology section of CPT®: reviewed    Risk of Complications, Morbidity, and/or Mortality  Presenting problems: low  Diagnostic procedures: low  Management options: low        Final diagnoses:   Acute non intractable tension-type headache            María Elena Goodwin, APRN  02/14/20 0118

## 2020-02-26 ENCOUNTER — OFFICE VISIT (OUTPATIENT)
Dept: FAMILY MEDICINE CLINIC | Facility: CLINIC | Age: 50
End: 2020-02-26

## 2020-02-26 VITALS
OXYGEN SATURATION: 99 % | WEIGHT: 181.4 LBS | DIASTOLIC BLOOD PRESSURE: 78 MMHG | HEIGHT: 61 IN | BODY MASS INDEX: 34.25 KG/M2 | SYSTOLIC BLOOD PRESSURE: 140 MMHG | HEART RATE: 83 BPM | TEMPERATURE: 98.2 F

## 2020-02-26 DIAGNOSIS — K21.9 GASTROESOPHAGEAL REFLUX DISEASE WITHOUT ESOPHAGITIS: Primary | ICD-10-CM

## 2020-02-26 DIAGNOSIS — F17.200 CURRENT EVERY DAY SMOKER: ICD-10-CM

## 2020-02-26 DIAGNOSIS — Z76.89 ENCOUNTER TO ESTABLISH CARE: ICD-10-CM

## 2020-02-26 DIAGNOSIS — R63.0 POOR APPETITE: ICD-10-CM

## 2020-02-26 DIAGNOSIS — R03.0 ELEVATED BLOOD PRESSURE READING: ICD-10-CM

## 2020-02-26 DIAGNOSIS — G40.909 SEIZURE DISORDER (HCC): ICD-10-CM

## 2020-02-26 DIAGNOSIS — R63.4 WEIGHT LOSS, UNINTENTIONAL: ICD-10-CM

## 2020-02-26 DIAGNOSIS — R10.13 EPIGASTRIC PAIN: ICD-10-CM

## 2020-02-26 PROCEDURE — 80061 LIPID PANEL: CPT | Performed by: FAMILY MEDICINE

## 2020-02-26 PROCEDURE — 99204 OFFICE O/P NEW MOD 45 MIN: CPT | Performed by: FAMILY MEDICINE

## 2020-02-26 PROCEDURE — 80050 GENERAL HEALTH PANEL: CPT | Performed by: FAMILY MEDICINE

## 2020-02-26 RX ORDER — OMEPRAZOLE 20 MG/1
20 CAPSULE, DELAYED RELEASE ORAL DAILY
Qty: 60 CAPSULE | Refills: 1 | Status: SHIPPED | OUTPATIENT
Start: 2020-02-26 | End: 2020-04-26

## 2020-02-26 RX ORDER — DIVALPROEX SODIUM 125 MG/1
125 TABLET, DELAYED RELEASE ORAL 3 TIMES DAILY
Qty: 270 TABLET | Refills: 0 | Status: SHIPPED | OUTPATIENT
Start: 2020-02-26

## 2020-02-26 NOTE — PROGRESS NOTES
Subjective   Lanny Che is a 49 y.o. female.   Pt presents today with [facility] of [facility] and Weight Loss      History of Present Illness   1.  Patient is a 49-year-old female here to [facility].  She recently moved here from [place] in September.  She also has lived in [facility].  She works locally at a factory.  #2 she has a history of seizure disorder.  She takes Depakote 125 mg 3 times a day.  She would like a refill of this.  She admits that she has not been on this medication in a few weeks because she has been out.  She denies recent seizures.  #3 she has generalized musculoskeletal pain in her shoulders down to her tailbone.  It is worse at times, severity 7 out of 10 at its worst.  She currently takes ibuprofen and Tylenol as needed for this.  #4 she reports that over the last year she has lost 100 pounds.  This was unintentional.  She says that she is nauseous most of the time, this is been associated with epigastric pain.  She notes that spicy foods tend to make it worse.  Her gallbladder was removed years ago without complication.  She also reports hot flashes associated during this time.  She denies any other symptoms.         The following portions of the patient's history were reviewed and updated as appropriate: allergies, current medications, past family history, past medical history, past social history, past surgical history and problem list.    Review of Systems   Constitutional: Negative for chills, fever and unexpected weight loss.   HENT: Negative for congestion and sore throat.    Eyes: Negative for visual disturbance.   Respiratory: Negative for cough and wheezing.    Cardiovascular: Negative for chest pain and palpitations.   Gastrointestinal: Negative for abdominal pain and diarrhea.   Genitourinary: Negative for dysuria.   Musculoskeletal: Negative for arthralgias and neck stiffness.   Neurological: Negative for dizziness, seizures and syncope.   Psychiatric/Behavioral:  Negative for self-injury, suicidal ideas and depressed mood.       Objective   Physical Exam   Constitutional: She is oriented to person, place, and time. She appears well-developed and well-nourished.   HENT:   Head: Normocephalic and atraumatic.   Right Ear: External ear normal.   Left Ear: External ear normal.   Nose: Nose normal.   Mouth/Throat: Oropharynx is clear and moist.   Eyes: Pupils are equal, round, and reactive to light. Conjunctivae and EOM are normal.   Neck: Normal range of motion. Neck supple.   Cardiovascular: Normal rate, regular rhythm and normal heart sounds.   Pulmonary/Chest: Effort normal and breath sounds normal.   Abdominal: Soft. Bowel sounds are normal. She exhibits no distension and no mass. There is no tenderness. There is no rebound and no guarding. No hernia.   Musculoskeletal:   On osteopathic exam, she has myofascial tenderness.   Neurological: She is alert and oriented to person, place, and time.   Skin: Skin is warm and dry.   Psychiatric: She has a normal mood and affect. Her behavior is normal. Judgment and thought content normal.   Nursing note and vitals reviewed.        Assessment/Plan   Lanny was seen today for establish care and weight loss.    Diagnoses and all orders for this visit:    Gastroesophageal reflux disease without esophagitis/gastritis  -     omeprazole (PrilOSEC) 20 MG capsule; Take 1 capsule by mouth Daily for 60 days.  -     CBC Auto Differential; Future  -     Comprehensive Metabolic Panel; Future  -     CBC Auto Differential  -     Comprehensive Metabolic Panel  Will start Prilosec with the presumed diagnosis of gastritis, because of vomiting is not a problem, only nausea, urgent work-up.I think is indicated.  We will get basic lab work.  Getting medical records.  She has not done much for this other than help her diet some, she states that she stays nauseous, food does seem to help, however.  The side effects of omeprazole were discussed.  We will do 20  mg daily for 30 to 60 days.  Epigastric pain  As above  Weight loss, unintentional  -     TSH; Future  -     TSH  Unclear what has caused the weight loss.  As she reports 100 pounds of weight loss over the past year, we will be prompt with work-up.  Poor appetite  -     omeprazole (PrilOSEC) 20 MG capsule; Take 1 capsule by mouth Daily for 60 days.  -     CBC Auto Differential; Future  -     Comprehensive Metabolic Panel; Future  -     TSH; Future  -     CBC Auto Differential  -     Comprehensive Metabolic Panel  -     TSH    Elevated blood pressure reading  -     omeprazole (PrilOSEC) 20 MG capsule; Take 1 capsule by mouth Daily for 60 days.  -     CBC Auto Differential; Future  -     Comprehensive Metabolic Panel; Future  -     Lipid Panel; Future  -     TSH; Future  -     CBC Auto Differential  -     Comprehensive Metabolic Panel  -     Lipid Panel  -     TSH  We will get basic labs.  Without other indication, blood pressure of 140/78 does not warrant treatment at this time.  Encounter to establish care  -     CBC Auto Differential; Future  -     Comprehensive Metabolic Panel; Future  -     Lipid Panel; Future  -     TSH; Future  -     CBC Auto Differential  -     Comprehensive Metabolic Panel  -     Lipid Panel  -     TSH    Seizure disorder (CMS/HCC)  -     divalproex (DEPAKOTE) 125 MG DR tablet; Take 1 tablet by mouth 3 (Three) Times a Day.  We will continue the current dose.  We will be getting medical records from old provider.  As she has not taken this medication in the last few weeks, no need to get a level.  She has done well on this medication for years reportedly.  Current every day smoker    She has no interest in quitting at this time.             Lanny Chinedu  reports that she has been smoking. She has never used smokeless tobacco.. I have educated her on the risk of diseases from using tobacco products such as cancer, COPD and heart diease.     I advised her to quit and she is not willing to  quit.    I spent 3  minutes counseling the patient.         Patient's Body mass index is 34.28 kg/m². BMI is above normal parameters. Recommendations include: exercise counseling and nutrition counseling.

## 2020-02-27 ENCOUNTER — TELEPHONE (OUTPATIENT)
Dept: PSYCHIATRY | Facility: CLINIC | Age: 50
End: 2020-02-27

## 2020-02-27 LAB
ALBUMIN SERPL-MCNC: 4.3 G/DL (ref 3.5–5.2)
ALBUMIN/GLOB SERPL: 1.4 G/DL
ALP SERPL-CCNC: 81 U/L (ref 39–117)
ALT SERPL W P-5'-P-CCNC: 35 U/L (ref 1–33)
ANION GAP SERPL CALCULATED.3IONS-SCNC: 12.4 MMOL/L (ref 5–15)
AST SERPL-CCNC: 18 U/L (ref 1–32)
BASOPHILS # BLD AUTO: 0.05 10*3/MM3 (ref 0–0.2)
BASOPHILS NFR BLD AUTO: 0.7 % (ref 0–1.5)
BILIRUB SERPL-MCNC: 0.5 MG/DL (ref 0.2–1.2)
BUN BLD-MCNC: 16 MG/DL (ref 6–20)
BUN/CREAT SERPL: 23.2 (ref 7–25)
CALCIUM SPEC-SCNC: 9.7 MG/DL (ref 8.6–10.5)
CHLORIDE SERPL-SCNC: 99 MMOL/L (ref 98–107)
CHOLEST SERPL-MCNC: 177 MG/DL (ref 0–200)
CO2 SERPL-SCNC: 25.6 MMOL/L (ref 22–29)
CREAT BLD-MCNC: 0.69 MG/DL (ref 0.57–1)
DEPRECATED RDW RBC AUTO: 41.9 FL (ref 37–54)
EOSINOPHIL # BLD AUTO: 0.24 10*3/MM3 (ref 0–0.4)
EOSINOPHIL NFR BLD AUTO: 3.2 % (ref 0.3–6.2)
ERYTHROCYTE [DISTWIDTH] IN BLOOD BY AUTOMATED COUNT: 12.7 % (ref 12.3–15.4)
GFR SERPL CREATININE-BSD FRML MDRD: 91 ML/MIN/1.73
GLOBULIN UR ELPH-MCNC: 3.1 GM/DL
GLUCOSE BLD-MCNC: 83 MG/DL (ref 65–99)
HCT VFR BLD AUTO: 40.7 % (ref 34–46.6)
HDLC SERPL-MCNC: 50 MG/DL (ref 40–60)
HGB BLD-MCNC: 14.3 G/DL (ref 12–15.9)
IMM GRANULOCYTES # BLD AUTO: 0.03 10*3/MM3 (ref 0–0.05)
IMM GRANULOCYTES NFR BLD AUTO: 0.4 % (ref 0–0.5)
LDLC SERPL CALC-MCNC: 102 MG/DL (ref 0–100)
LDLC/HDLC SERPL: 2.05 {RATIO}
LYMPHOCYTES # BLD AUTO: 1.88 10*3/MM3 (ref 0.7–3.1)
LYMPHOCYTES NFR BLD AUTO: 25.2 % (ref 19.6–45.3)
MCH RBC QN AUTO: 32.1 PG (ref 26.6–33)
MCHC RBC AUTO-ENTMCNC: 35.1 G/DL (ref 31.5–35.7)
MCV RBC AUTO: 91.3 FL (ref 79–97)
MONOCYTES # BLD AUTO: 0.7 10*3/MM3 (ref 0.1–0.9)
MONOCYTES NFR BLD AUTO: 9.4 % (ref 5–12)
NEUTROPHILS # BLD AUTO: 4.57 10*3/MM3 (ref 1.7–7)
NEUTROPHILS NFR BLD AUTO: 61.1 % (ref 42.7–76)
NRBC BLD AUTO-RTO: 0 /100 WBC (ref 0–0.2)
PLATELET # BLD AUTO: 274 10*3/MM3 (ref 140–450)
PMV BLD AUTO: 11.6 FL (ref 6–12)
POTASSIUM BLD-SCNC: 4.3 MMOL/L (ref 3.5–5.2)
PROT SERPL-MCNC: 7.4 G/DL (ref 6–8.5)
RBC # BLD AUTO: 4.46 10*6/MM3 (ref 3.77–5.28)
SODIUM BLD-SCNC: 137 MMOL/L (ref 136–145)
TRIGL SERPL-MCNC: 123 MG/DL (ref 0–150)
TSH SERPL DL<=0.05 MIU/L-ACNC: 3.11 UIU/ML (ref 0.27–4.2)
VLDLC SERPL-MCNC: 24.6 MG/DL (ref 5–40)
WBC NRBC COR # BLD: 7.47 10*3/MM3 (ref 3.4–10.8)

## 2020-02-27 NOTE — TELEPHONE ENCOUNTER
----- Message from Andrez Colunga DO sent at 2/27/2020  1:11 PM EST -----  Your labs were normal.  No concerns with your laboratory work.      INFORMED PATIENT OF RESULTS. SHE VOICED UNDERSTANDING.

## 2024-09-16 ENCOUNTER — HOSPITAL ENCOUNTER (INPATIENT)
Facility: HOSPITAL | Age: 54
LOS: 9 days | Discharge: HOME | End: 2024-09-25
Attending: PSYCHIATRY & NEUROLOGY | Admitting: PSYCHIATRY & NEUROLOGY
Payer: COMMERCIAL

## 2024-09-16 ENCOUNTER — HOSPITAL ENCOUNTER (EMERGENCY)
Facility: HOSPITAL | Age: 54
Discharge: PSYCHIATRIC OR SUBSTANCE USE FACILITY | DRG: 885 | End: 2024-09-16
Attending: STUDENT IN AN ORGANIZED HEALTH CARE EDUCATION/TRAINING PROGRAM | Admitting: STUDENT IN AN ORGANIZED HEALTH CARE EDUCATION/TRAINING PROGRAM
Payer: COMMERCIAL

## 2024-09-16 VITALS
RESPIRATION RATE: 17 BRPM | TEMPERATURE: 98.4 F | HEIGHT: 61 IN | OXYGEN SATURATION: 96 % | DIASTOLIC BLOOD PRESSURE: 78 MMHG | HEART RATE: 110 BPM | WEIGHT: 181.44 LBS | BODY MASS INDEX: 34.26 KG/M2 | SYSTOLIC BLOOD PRESSURE: 124 MMHG

## 2024-09-16 DIAGNOSIS — F32.A DEPRESSION WITH SUICIDAL IDEATION: Primary | ICD-10-CM

## 2024-09-16 DIAGNOSIS — R45.851 DEPRESSION WITH SUICIDAL IDEATION: Primary | ICD-10-CM

## 2024-09-16 LAB
ALBUMIN SERPL-MCNC: 3.9 G/DL (ref 3.5–5.2)
ALBUMIN/GLOB SERPL: 1.1 G/DL
ALP SERPL-CCNC: 130 U/L (ref 39–117)
ALT SERPL W P-5'-P-CCNC: 41 U/L (ref 1–33)
AMPHET+METHAMPHET UR QL: NEGATIVE
AMPHETAMINES UR QL: NEGATIVE
ANION GAP SERPL CALCULATED.3IONS-SCNC: 8.1 MMOL/L (ref 5–15)
AST SERPL-CCNC: 24 U/L (ref 1–32)
B-HCG UR QL: NEGATIVE
BACTERIA UR QL AUTO: ABNORMAL /HPF
BARBITURATES UR QL SCN: NEGATIVE
BASOPHILS # BLD AUTO: 0.04 10*3/MM3 (ref 0–0.2)
BASOPHILS NFR BLD AUTO: 0.5 % (ref 0–1.5)
BENZODIAZ UR QL SCN: NEGATIVE
BILIRUB SERPL-MCNC: 0.2 MG/DL (ref 0–1.2)
BILIRUB UR QL STRIP: NEGATIVE
BUN SERPL-MCNC: 8 MG/DL (ref 6–20)
BUN/CREAT SERPL: 11.3 (ref 7–25)
BUPRENORPHINE SERPL-MCNC: POSITIVE NG/ML
CALCIUM SPEC-SCNC: 9 MG/DL (ref 8.6–10.5)
CANNABINOIDS SERPL QL: NEGATIVE
CHLORIDE SERPL-SCNC: 103 MMOL/L (ref 98–107)
CLARITY UR: CLEAR
CO2 SERPL-SCNC: 24.9 MMOL/L (ref 22–29)
COCAINE UR QL: NEGATIVE
COLOR UR: YELLOW
CREAT SERPL-MCNC: 0.71 MG/DL (ref 0.57–1)
DEPRECATED RDW RBC AUTO: 47.4 FL (ref 37–54)
EGFRCR SERPLBLD CKD-EPI 2021: 103.1 ML/MIN/1.73
EOSINOPHIL # BLD AUTO: 0.41 10*3/MM3 (ref 0–0.4)
EOSINOPHIL NFR BLD AUTO: 5.2 % (ref 0.3–6.2)
ERYTHROCYTE [DISTWIDTH] IN BLOOD BY AUTOMATED COUNT: 14.5 % (ref 12.3–15.4)
ETHANOL BLD-MCNC: <10 MG/DL (ref 0–10)
ETHANOL UR QL: <0.01 %
FENTANYL UR-MCNC: NEGATIVE NG/ML
GLOBULIN UR ELPH-MCNC: 3.5 GM/DL
GLUCOSE SERPL-MCNC: 144 MG/DL (ref 65–99)
GLUCOSE UR STRIP-MCNC: NEGATIVE MG/DL
HAV IGM SERPL QL IA: ABNORMAL
HBV CORE IGM SERPL QL IA: REACTIVE
HBV SURFACE AG SERPL QL IA: ABNORMAL
HCT VFR BLD AUTO: 41.2 % (ref 34–46.6)
HCV AB SER QL: ABNORMAL
HGB BLD-MCNC: 13.6 G/DL (ref 12–15.9)
HGB UR QL STRIP.AUTO: NEGATIVE
HOLD SPECIMEN: NORMAL
HOLD SPECIMEN: NORMAL
HYALINE CASTS UR QL AUTO: ABNORMAL /LPF
IMM GRANULOCYTES # BLD AUTO: 0.02 10*3/MM3 (ref 0–0.05)
IMM GRANULOCYTES NFR BLD AUTO: 0.3 % (ref 0–0.5)
KETONES UR QL STRIP: NEGATIVE
LEUKOCYTE ESTERASE UR QL STRIP.AUTO: ABNORMAL
LYMPHOCYTES # BLD AUTO: 1.63 10*3/MM3 (ref 0.7–3.1)
LYMPHOCYTES NFR BLD AUTO: 20.5 % (ref 19.6–45.3)
MAGNESIUM SERPL-MCNC: 1.9 MG/DL (ref 1.6–2.6)
MCH RBC QN AUTO: 29.6 PG (ref 26.6–33)
MCHC RBC AUTO-ENTMCNC: 33 G/DL (ref 31.5–35.7)
MCV RBC AUTO: 89.8 FL (ref 79–97)
METHADONE UR QL SCN: NEGATIVE
MONOCYTES # BLD AUTO: 0.54 10*3/MM3 (ref 0.1–0.9)
MONOCYTES NFR BLD AUTO: 6.8 % (ref 5–12)
NEUTROPHILS NFR BLD AUTO: 5.3 10*3/MM3 (ref 1.7–7)
NEUTROPHILS NFR BLD AUTO: 66.7 % (ref 42.7–76)
NITRITE UR QL STRIP: NEGATIVE
NRBC BLD AUTO-RTO: 0 /100 WBC (ref 0–0.2)
OPIATES UR QL: NEGATIVE
OXYCODONE UR QL SCN: NEGATIVE
PCP UR QL SCN: NEGATIVE
PH UR STRIP.AUTO: 6 [PH] (ref 5–8)
PLATELET # BLD AUTO: 247 10*3/MM3 (ref 140–450)
PMV BLD AUTO: 10.1 FL (ref 6–12)
POTASSIUM SERPL-SCNC: 3.7 MMOL/L (ref 3.5–5.2)
PROT SERPL-MCNC: 7.4 G/DL (ref 6–8.5)
PROT UR QL STRIP: NEGATIVE
RBC # BLD AUTO: 4.59 10*6/MM3 (ref 3.77–5.28)
RBC # UR STRIP: ABNORMAL /HPF
REF LAB TEST METHOD: ABNORMAL
SODIUM SERPL-SCNC: 136 MMOL/L (ref 136–145)
SP GR UR STRIP: 1.01 (ref 1–1.03)
SQUAMOUS #/AREA URNS HPF: ABNORMAL /HPF
TRICYCLICS UR QL SCN: NEGATIVE
UROBILINOGEN UR QL STRIP: ABNORMAL
WBC # UR STRIP: ABNORMAL /HPF
WBC NRBC COR # BLD AUTO: 7.94 10*3/MM3 (ref 3.4–10.8)
WHOLE BLOOD HOLD COAG: NORMAL
WHOLE BLOOD HOLD SPECIMEN: NORMAL

## 2024-09-16 PROCEDURE — 87088 URINE BACTERIA CULTURE: CPT | Performed by: PSYCHIATRY & NEUROLOGY

## 2024-09-16 PROCEDURE — 80307 DRUG TEST PRSMV CHEM ANLYZR: CPT | Performed by: PHYSICIAN ASSISTANT

## 2024-09-16 PROCEDURE — 81001 URINALYSIS AUTO W/SCOPE: CPT | Performed by: PHYSICIAN ASSISTANT

## 2024-09-16 PROCEDURE — 81025 URINE PREGNANCY TEST: CPT | Performed by: PHYSICIAN ASSISTANT

## 2024-09-16 PROCEDURE — 82077 ASSAY SPEC XCP UR&BREATH IA: CPT | Performed by: PHYSICIAN ASSISTANT

## 2024-09-16 PROCEDURE — 80074 ACUTE HEPATITIS PANEL: CPT | Performed by: PSYCHIATRY & NEUROLOGY

## 2024-09-16 PROCEDURE — 99285 EMERGENCY DEPT VISIT HI MDM: CPT

## 2024-09-16 PROCEDURE — 87086 URINE CULTURE/COLONY COUNT: CPT | Performed by: PSYCHIATRY & NEUROLOGY

## 2024-09-16 PROCEDURE — 80053 COMPREHEN METABOLIC PANEL: CPT | Performed by: PHYSICIAN ASSISTANT

## 2024-09-16 PROCEDURE — 87186 SC STD MICRODIL/AGAR DIL: CPT | Performed by: PSYCHIATRY & NEUROLOGY

## 2024-09-16 PROCEDURE — 83735 ASSAY OF MAGNESIUM: CPT | Performed by: PHYSICIAN ASSISTANT

## 2024-09-16 PROCEDURE — 93010 ELECTROCARDIOGRAM REPORT: CPT | Performed by: INTERNAL MEDICINE

## 2024-09-16 PROCEDURE — 87591 N.GONORRHOEAE DNA AMP PROB: CPT | Performed by: PSYCHIATRY & NEUROLOGY

## 2024-09-16 PROCEDURE — 87491 CHLMYD TRACH DNA AMP PROBE: CPT | Performed by: PSYCHIATRY & NEUROLOGY

## 2024-09-16 PROCEDURE — 93005 ELECTROCARDIOGRAM TRACING: CPT | Performed by: PSYCHIATRY & NEUROLOGY

## 2024-09-16 PROCEDURE — 36415 COLL VENOUS BLD VENIPUNCTURE: CPT

## 2024-09-16 PROCEDURE — 85025 COMPLETE CBC W/AUTO DIFF WBC: CPT | Performed by: PHYSICIAN ASSISTANT

## 2024-09-16 RX ORDER — PRAZOSIN HYDROCHLORIDE 1 MG/1
3 CAPSULE ORAL NIGHTLY
Status: CANCELLED | OUTPATIENT
Start: 2024-09-16

## 2024-09-16 RX ORDER — TOPIRAMATE 25 MG/1
25 TABLET, FILM COATED ORAL NIGHTLY
COMMUNITY
End: 2024-09-25 | Stop reason: HOSPADM

## 2024-09-16 RX ORDER — DULOXETIN HYDROCHLORIDE 30 MG/1
90 CAPSULE, DELAYED RELEASE ORAL DAILY
Status: CANCELLED | OUTPATIENT
Start: 2024-09-17

## 2024-09-16 RX ORDER — PRAZOSIN HYDROCHLORIDE 1 MG/1
3 CAPSULE ORAL NIGHTLY
COMMUNITY
End: 2024-09-25 | Stop reason: HOSPADM

## 2024-09-16 RX ORDER — PANTOPRAZOLE SODIUM 20 MG/1
20 TABLET, DELAYED RELEASE ORAL DAILY
COMMUNITY

## 2024-09-16 RX ORDER — TOPIRAMATE 25 MG/1
25 TABLET, FILM COATED ORAL NIGHTLY
Status: CANCELLED | OUTPATIENT
Start: 2024-09-16

## 2024-09-16 RX ORDER — PANTOPRAZOLE SODIUM 20 MG/1
20 TABLET, DELAYED RELEASE ORAL DAILY
Status: CANCELLED | OUTPATIENT
Start: 2024-09-17

## 2024-09-16 RX ORDER — NIFEDIPINE 30 MG/1
30 TABLET, EXTENDED RELEASE ORAL DAILY
COMMUNITY

## 2024-09-16 RX ORDER — FAMOTIDINE 20 MG/1
20 TABLET, FILM COATED ORAL 2 TIMES DAILY PRN
Status: DISCONTINUED | OUTPATIENT
Start: 2024-09-16 | End: 2024-09-25 | Stop reason: HOSPADM

## 2024-09-16 RX ORDER — NIFEDIPINE 30 MG/1
30 TABLET, EXTENDED RELEASE ORAL DAILY
Status: CANCELLED | OUTPATIENT
Start: 2024-09-17

## 2024-09-16 RX ORDER — PROPRANOLOL HCL 10 MG
10 TABLET ORAL DAILY PRN
COMMUNITY
End: 2024-09-25 | Stop reason: HOSPADM

## 2024-09-16 RX ORDER — LISINOPRIL 10 MG/1
20 TABLET ORAL DAILY
Status: CANCELLED | OUTPATIENT
Start: 2024-09-17

## 2024-09-16 RX ORDER — ROPINIROLE 0.25 MG/1
0.5 TABLET, FILM COATED ORAL NIGHTLY
Status: CANCELLED | OUTPATIENT
Start: 2024-09-16

## 2024-09-16 RX ORDER — HYDROXYZINE HYDROCHLORIDE 25 MG/1
50 TABLET, FILM COATED ORAL EVERY 6 HOURS PRN
Status: DISCONTINUED | OUTPATIENT
Start: 2024-09-16 | End: 2024-09-25 | Stop reason: HOSPADM

## 2024-09-16 RX ORDER — IBUPROFEN 400 MG/1
400 TABLET, FILM COATED ORAL EVERY 6 HOURS PRN
Status: DISCONTINUED | OUTPATIENT
Start: 2024-09-16 | End: 2024-09-20

## 2024-09-16 RX ORDER — DULOXETIN HYDROCHLORIDE 30 MG/1
90 CAPSULE, DELAYED RELEASE ORAL DAILY
COMMUNITY
Start: 2024-08-29

## 2024-09-16 RX ORDER — LISINOPRIL 20 MG/1
20 TABLET ORAL DAILY
COMMUNITY

## 2024-09-16 RX ORDER — POLYETHYLENE GLYCOL 3350 17 G/17G
17 POWDER, FOR SOLUTION ORAL DAILY PRN
Status: DISCONTINUED | OUTPATIENT
Start: 2024-09-16 | End: 2024-09-25 | Stop reason: HOSPADM

## 2024-09-16 RX ORDER — ONDANSETRON 4 MG/1
4 TABLET, ORALLY DISINTEGRATING ORAL EVERY 6 HOURS PRN
Status: DISCONTINUED | OUTPATIENT
Start: 2024-09-16 | End: 2024-09-25 | Stop reason: HOSPADM

## 2024-09-16 RX ORDER — ECHINACEA PURPUREA EXTRACT 125 MG
2 TABLET ORAL AS NEEDED
Status: DISCONTINUED | OUTPATIENT
Start: 2024-09-16 | End: 2024-09-25 | Stop reason: HOSPADM

## 2024-09-16 RX ORDER — ROPINIROLE 0.5 MG/1
0.5 TABLET, FILM COATED ORAL NIGHTLY
COMMUNITY

## 2024-09-16 RX ORDER — LOPERAMIDE HCL 2 MG
2 CAPSULE ORAL
Status: DISCONTINUED | OUTPATIENT
Start: 2024-09-16 | End: 2024-09-25 | Stop reason: HOSPADM

## 2024-09-16 RX ORDER — PROPRANOLOL HCL 10 MG
10 TABLET ORAL DAILY PRN
Status: CANCELLED | OUTPATIENT
Start: 2024-09-16

## 2024-09-16 RX ORDER — BUPRENORPHINE HYDROCHLORIDE AND NALOXONE HYDROCHLORIDE DIHYDRATE 8; 2 MG/1; MG/1
1.5 TABLET SUBLINGUAL DAILY
Status: CANCELLED | OUTPATIENT
Start: 2024-09-17

## 2024-09-16 RX ORDER — ACETAMINOPHEN 325 MG/1
650 TABLET ORAL EVERY 6 HOURS PRN
Status: DISCONTINUED | OUTPATIENT
Start: 2024-09-16 | End: 2024-09-25 | Stop reason: HOSPADM

## 2024-09-16 RX ORDER — BENZTROPINE MESYLATE 1 MG/ML
1 INJECTION, SOLUTION INTRAMUSCULAR; INTRAVENOUS ONCE AS NEEDED
Status: DISCONTINUED | OUTPATIENT
Start: 2024-09-16 | End: 2024-09-25 | Stop reason: HOSPADM

## 2024-09-16 RX ORDER — BENZONATATE 100 MG/1
100 CAPSULE ORAL 3 TIMES DAILY PRN
Status: DISCONTINUED | OUTPATIENT
Start: 2024-09-16 | End: 2024-09-25 | Stop reason: HOSPADM

## 2024-09-16 RX ORDER — BENZTROPINE MESYLATE 1 MG/1
2 TABLET ORAL ONCE AS NEEDED
Status: DISCONTINUED | OUTPATIENT
Start: 2024-09-16 | End: 2024-09-25 | Stop reason: HOSPADM

## 2024-09-16 RX ORDER — NICOTINE 21 MG/24HR
1 PATCH, TRANSDERMAL 24 HOURS TRANSDERMAL
Status: DISCONTINUED | OUTPATIENT
Start: 2024-09-16 | End: 2024-09-25 | Stop reason: HOSPADM

## 2024-09-16 RX ORDER — HYDROXYZINE HYDROCHLORIDE 25 MG/1
25 TABLET, FILM COATED ORAL DAILY PRN
COMMUNITY

## 2024-09-16 RX ORDER — ALUMINA, MAGNESIA, AND SIMETHICONE 2400; 2400; 240 MG/30ML; MG/30ML; MG/30ML
15 SUSPENSION ORAL EVERY 6 HOURS PRN
Status: DISCONTINUED | OUTPATIENT
Start: 2024-09-16 | End: 2024-09-25 | Stop reason: HOSPADM

## 2024-09-16 RX ORDER — TRAZODONE HYDROCHLORIDE 50 MG/1
50 TABLET, FILM COATED ORAL NIGHTLY PRN
Status: DISCONTINUED | OUTPATIENT
Start: 2024-09-16 | End: 2024-09-22

## 2024-09-16 RX ORDER — BUPRENORPHINE AND NALOXONE 8; 2 MG/1; MG/1
1.5 FILM, SOLUBLE BUCCAL; SUBLINGUAL DAILY
COMMUNITY
Start: 2024-09-11

## 2024-09-16 RX ADMIN — NICOTINE 1 PATCH: 21 PATCH TRANSDERMAL at 16:52

## 2024-09-16 NOTE — NURSING NOTE
Pt assessment complete   Pt states that she has been having increasing stress due to relationship with significant other. She states for the last 3 months they have been living in a hotel.   Pt reports si with a plan to go in the woods and overdose and states if that didn't work she would slit her wrist.   Pt denies hi     Pt reports seeing Shadow people, and hearing conversations between people  Poor sleep/appetite   Depression 10   Anxiety 10   Pt reports being prescribed 10mg suboxone daily last use 9/16/24
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
## 2024-09-17 LAB
C TRACH RRNA CVX QL NAA+PROBE: NOT DETECTED
N GONORRHOEA RRNA SPEC QL NAA+PROBE: NOT DETECTED
QT INTERVAL: 358 MS
QTC INTERVAL: 426 MS

## 2024-09-17 PROCEDURE — 99223 1ST HOSP IP/OBS HIGH 75: CPT | Performed by: PSYCHIATRY & NEUROLOGY

## 2024-09-17 RX ORDER — BUPRENORPHINE HYDROCHLORIDE AND NALOXONE HYDROCHLORIDE DIHYDRATE 8; 2 MG/1; MG/1
1.5 TABLET SUBLINGUAL DAILY
Status: COMPLETED | OUTPATIENT
Start: 2024-09-17 | End: 2024-09-21

## 2024-09-17 RX ORDER — DULOXETIN HYDROCHLORIDE 30 MG/1
90 CAPSULE, DELAYED RELEASE ORAL DAILY
Status: DISCONTINUED | OUTPATIENT
Start: 2024-09-17 | End: 2024-09-25 | Stop reason: HOSPADM

## 2024-09-17 RX ORDER — TOPIRAMATE 25 MG/1
50 TABLET, FILM COATED ORAL NIGHTLY
Status: DISCONTINUED | OUTPATIENT
Start: 2024-09-17 | End: 2024-09-20

## 2024-09-17 RX ORDER — ROPINIROLE 0.25 MG/1
0.5 TABLET, FILM COATED ORAL NIGHTLY
Status: DISCONTINUED | OUTPATIENT
Start: 2024-09-17 | End: 2024-09-25 | Stop reason: HOSPADM

## 2024-09-17 RX ORDER — ARIPIPRAZOLE 10 MG/1
5 TABLET ORAL NIGHTLY
Status: DISCONTINUED | OUTPATIENT
Start: 2024-09-17 | End: 2024-09-25 | Stop reason: HOSPADM

## 2024-09-17 RX ORDER — PRAZOSIN HYDROCHLORIDE 1 MG/1
3 CAPSULE ORAL NIGHTLY
Status: DISCONTINUED | OUTPATIENT
Start: 2024-09-17 | End: 2024-09-19

## 2024-09-17 RX ORDER — PANTOPRAZOLE SODIUM 20 MG/1
20 TABLET, DELAYED RELEASE ORAL DAILY
Status: DISCONTINUED | OUTPATIENT
Start: 2024-09-17 | End: 2024-09-17

## 2024-09-17 RX ORDER — NIFEDIPINE 30 MG/1
30 TABLET, EXTENDED RELEASE ORAL
Status: DISCONTINUED | OUTPATIENT
Start: 2024-09-17 | End: 2024-09-25 | Stop reason: HOSPADM

## 2024-09-17 RX ORDER — PANTOPRAZOLE SODIUM 40 MG/1
40 TABLET, DELAYED RELEASE ORAL DAILY
Status: DISCONTINUED | OUTPATIENT
Start: 2024-09-17 | End: 2024-09-25 | Stop reason: HOSPADM

## 2024-09-17 RX ORDER — LISINOPRIL 10 MG/1
20 TABLET ORAL
Status: DISCONTINUED | OUTPATIENT
Start: 2024-09-17 | End: 2024-09-25 | Stop reason: HOSPADM

## 2024-09-17 RX ADMIN — PRAZOSIN HYDROCHLORIDE 3 MG: 1 CAPSULE ORAL at 20:14

## 2024-09-17 RX ADMIN — IBUPROFEN 400 MG: 400 TABLET, FILM COATED ORAL at 08:03

## 2024-09-17 RX ADMIN — ARIPIPRAZOLE 5 MG: 10 TABLET ORAL at 20:14

## 2024-09-17 RX ADMIN — LISINOPRIL 20 MG: 10 TABLET ORAL at 11:42

## 2024-09-17 RX ADMIN — NIFEDIPINE 30 MG: 30 TABLET, FILM COATED, EXTENDED RELEASE ORAL at 13:04

## 2024-09-17 RX ADMIN — PANTOPRAZOLE SODIUM 40 MG: 40 TABLET, DELAYED RELEASE ORAL at 12:27

## 2024-09-17 RX ADMIN — TOPIRAMATE 50 MG: 25 TABLET, FILM COATED ORAL at 20:14

## 2024-09-17 RX ADMIN — NICOTINE 1 PATCH: 21 PATCH TRANSDERMAL at 08:03

## 2024-09-17 RX ADMIN — DULOXETINE HYDROCHLORIDE 90 MG: 30 CAPSULE, DELAYED RELEASE ORAL at 11:42

## 2024-09-17 RX ADMIN — BUPRENORPHINE AND NALOXONE 1.5 TABLET: 8; 2 TABLET SUBLINGUAL at 11:42

## 2024-09-17 RX ADMIN — ROPINIROLE HYDROCHLORIDE 0.5 MG: 0.25 TABLET, FILM COATED ORAL at 20:15

## 2024-09-17 RX ADMIN — HYDROXYZINE HYDROCHLORIDE 50 MG: 25 TABLET ORAL at 08:03

## 2024-09-17 NOTE — PLAN OF CARE
Goal Outcome Evaluation:  Plan of Care Reviewed With: patient  Patient Agreement with Plan of Care: agrees     Progress: improving  Outcome Evaluation: Pt is calm and cooperative with staff. Pt reports fair sleep and poor appetite. Pt rates dep and anx 10/10. Pt denies SI/HI but reports that she hears voices from time to time.

## 2024-09-17 NOTE — NURSING NOTE
Spoke with Will in lab. Made him aware that Dr. ALISIA Madera wanted sensitivity studies added to new lab orders. Per Will, pts urine from admission can be used for the new lab orders.

## 2024-09-17 NOTE — PLAN OF CARE
Goal Outcome Evaluation:  Plan of Care Reviewed With: patient  Patient Agreement with Plan of Care: agrees     Progress: improving     Pt reports poor sleep r/t nightmares. Pt reports poor appetite. Pt rates anx 10/10 and dep 10/10. Pt reports SI no plan and denies HI/AVH.
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
## 2024-09-18 PROCEDURE — 99232 SBSQ HOSP IP/OBS MODERATE 35: CPT | Performed by: PSYCHIATRY & NEUROLOGY

## 2024-09-18 RX ORDER — LIDOCAINE 4 G/G
1 PATCH TOPICAL
Status: DISCONTINUED | OUTPATIENT
Start: 2024-09-18 | End: 2024-09-21

## 2024-09-18 RX ADMIN — BUPRENORPHINE AND NALOXONE 1.5 TABLET: 8; 2 TABLET SUBLINGUAL at 08:14

## 2024-09-18 RX ADMIN — ACETAMINOPHEN 650 MG: 325 TABLET ORAL at 09:13

## 2024-09-18 RX ADMIN — NICOTINE 1 PATCH: 21 PATCH TRANSDERMAL at 08:15

## 2024-09-18 RX ADMIN — LISINOPRIL 20 MG: 10 TABLET ORAL at 08:14

## 2024-09-18 RX ADMIN — IBUPROFEN 400 MG: 400 TABLET, FILM COATED ORAL at 00:59

## 2024-09-18 RX ADMIN — PRAZOSIN HYDROCHLORIDE 3 MG: 1 CAPSULE ORAL at 20:17

## 2024-09-18 RX ADMIN — PANTOPRAZOLE SODIUM 40 MG: 40 TABLET, DELAYED RELEASE ORAL at 08:14

## 2024-09-18 RX ADMIN — NIFEDIPINE 30 MG: 30 TABLET, FILM COATED, EXTENDED RELEASE ORAL at 09:13

## 2024-09-18 RX ADMIN — LIDOCAINE 1 PATCH: 4 PATCH TOPICAL at 12:18

## 2024-09-18 RX ADMIN — ARIPIPRAZOLE 5 MG: 10 TABLET ORAL at 20:17

## 2024-09-18 RX ADMIN — TOPIRAMATE 50 MG: 25 TABLET, FILM COATED ORAL at 20:17

## 2024-09-18 RX ADMIN — HYDROXYZINE HYDROCHLORIDE 50 MG: 25 TABLET ORAL at 09:12

## 2024-09-18 RX ADMIN — ROPINIROLE HYDROCHLORIDE 0.5 MG: 0.25 TABLET, FILM COATED ORAL at 20:17

## 2024-09-18 RX ADMIN — DULOXETINE HYDROCHLORIDE 90 MG: 30 CAPSULE, DELAYED RELEASE ORAL at 08:14

## 2024-09-18 NOTE — PLAN OF CARE
Problem: Adult Behavioral Health Plan of Care  Goal: Develops/Participates in Therapeutic [place] to Support Successful Transition  Intervention: Mutually Develop Transition Plan  Recent Flowsheet Documentation  Taken 9/18/2024 9034 by Sheridan Angel LCSW  Transportation Anticipated: health plan transportation  Transportation Concerns: no car  Current Discharge Risk:   psychiatric illness   substance use/abuse   homeless  Concerns to be Addressed:   coping/stress   suicidal   substance/tobacco abuse/use   mental health   relationship   cognitive/perceptual  Readmission Within the Last 30 Days: no previous admission in last 30 days  Patient/Family Anticipated Services at Transition:   outpatient care   mental health services  Patient/Family Anticipates Transition to: shelter  Offered/Gave Vendor List: no    Therapist reviewed Dr. Madera's assessment and discussed patient with nursing staff.  Attempted to meet with patient who made no response to this therapist.  Patient reports ongoing suicidal ideation.  Patient reports ongoing anxiety and depression at 10 on a scale of 1 to 10.  Patient has apparently mentioned that she is considering returning home upon stabilization.  Efforts will continue to engage in safe disposition planning.
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
## 2024-09-18 NOTE — PLAN OF CARE
Goal Outcome Evaluation:  Plan of Care Reviewed With: patient  Patient Agreement with Plan of Care: agrees     Progress: improving     Pt reports better sleep and fewer nightmares. Pt reports good appetite. Pt rates anx 10/10 and dep 10/10. Pt reports SI with no plan and denies HI/AVH.

## 2024-09-19 LAB — BACTERIA SPEC AEROBE CULT: ABNORMAL

## 2024-09-19 PROCEDURE — 99232 SBSQ HOSP IP/OBS MODERATE 35: CPT | Performed by: PSYCHIATRY & NEUROLOGY

## 2024-09-19 RX ORDER — PRAZOSIN HYDROCHLORIDE 1 MG/1
4 CAPSULE ORAL NIGHTLY
Status: DISCONTINUED | OUTPATIENT
Start: 2024-09-19 | End: 2024-09-25 | Stop reason: HOSPADM

## 2024-09-19 RX ORDER — NITROFURANTOIN 25; 75 MG/1; MG/1
100 CAPSULE ORAL EVERY 12 HOURS SCHEDULED
Status: DISCONTINUED | OUTPATIENT
Start: 2024-09-19 | End: 2024-09-25 | Stop reason: HOSPADM

## 2024-09-19 RX ADMIN — TOPIRAMATE 50 MG: 25 TABLET, FILM COATED ORAL at 20:41

## 2024-09-19 RX ADMIN — ROPINIROLE HYDROCHLORIDE 0.5 MG: 0.25 TABLET, FILM COATED ORAL at 20:41

## 2024-09-19 RX ADMIN — ARIPIPRAZOLE 5 MG: 10 TABLET ORAL at 20:41

## 2024-09-19 RX ADMIN — NITROFURANTOIN MONOHYDRATE/MACROCRYSTALS 100 MG: 75; 25 CAPSULE ORAL at 20:41

## 2024-09-19 RX ADMIN — PRAZOSIN HYDROCHLORIDE 4 MG: 1 CAPSULE ORAL at 20:41

## 2024-09-19 RX ADMIN — NIFEDIPINE 30 MG: 30 TABLET, FILM COATED, EXTENDED RELEASE ORAL at 08:16

## 2024-09-19 RX ADMIN — NITROFURANTOIN MONOHYDRATE/MACROCRYSTALS 100 MG: 75; 25 CAPSULE ORAL at 11:39

## 2024-09-19 RX ADMIN — BUPRENORPHINE AND NALOXONE 1.5 TABLET: 8; 2 TABLET SUBLINGUAL at 08:17

## 2024-09-19 RX ADMIN — LISINOPRIL 20 MG: 10 TABLET ORAL at 08:17

## 2024-09-19 RX ADMIN — LIDOCAINE 1 PATCH: 4 PATCH TOPICAL at 08:17

## 2024-09-19 RX ADMIN — ACETAMINOPHEN 650 MG: 325 TABLET ORAL at 04:12

## 2024-09-19 RX ADMIN — DULOXETINE HYDROCHLORIDE 90 MG: 30 CAPSULE, DELAYED RELEASE ORAL at 08:17

## 2024-09-19 RX ADMIN — PANTOPRAZOLE SODIUM 40 MG: 40 TABLET, DELAYED RELEASE ORAL at 08:16

## 2024-09-19 RX ADMIN — NICOTINE 1 PATCH: 21 PATCH TRANSDERMAL at 08:18

## 2024-09-19 NOTE — PLAN OF CARE
Goal Outcome Evaluation:  Plan of Care Reviewed With: patient  Patient Agreement with Plan of Care: agrees     Progress: no change  Outcome Evaluation: Patient reports poor sleep and a good appetite. Rates A/D 10/10. Denies HI or hallucinations. Reports SI with no plan. Reports feeling helpless,hopeless or worthless. Yoel has slept about 10 hours this shift.

## 2024-09-19 NOTE — PLAN OF CARE
Goal Outcome Evaluation:  Plan of Care Reviewed With: patient  Patient Agreement with Plan of Care: agrees     Progress: no change  Outcome Evaluation: PATIENT FEELS SOME BETTER TODAY.  ATTENDING GROUP AND GOING TO DINING AREA FOR MEALS AND HAS BEEN WATCHING TV MOST OF THE SHIFT.  DENIES DYSURIA AND SUICIDAL IDEATIONS.
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
## 2024-09-20 PROCEDURE — 99232 SBSQ HOSP IP/OBS MODERATE 35: CPT | Performed by: PSYCHIATRY & NEUROLOGY

## 2024-09-20 RX ORDER — IBUPROFEN 400 MG/1
600 TABLET, FILM COATED ORAL EVERY 6 HOURS PRN
Status: DISCONTINUED | OUTPATIENT
Start: 2024-09-20 | End: 2024-09-25 | Stop reason: HOSPADM

## 2024-09-20 RX ORDER — TOPIRAMATE 25 MG/1
50 TABLET, FILM COATED ORAL EVERY 12 HOURS SCHEDULED
Status: DISCONTINUED | OUTPATIENT
Start: 2024-09-20 | End: 2024-09-25 | Stop reason: HOSPADM

## 2024-09-20 RX ADMIN — IBUPROFEN 400 MG: 400 TABLET, FILM COATED ORAL at 04:43

## 2024-09-20 RX ADMIN — TOPIRAMATE 50 MG: 25 TABLET, FILM COATED ORAL at 20:12

## 2024-09-20 RX ADMIN — NICOTINE 1 PATCH: 21 PATCH TRANSDERMAL at 08:19

## 2024-09-20 RX ADMIN — DULOXETINE HYDROCHLORIDE 90 MG: 30 CAPSULE, DELAYED RELEASE ORAL at 08:19

## 2024-09-20 RX ADMIN — LISINOPRIL 20 MG: 10 TABLET ORAL at 08:19

## 2024-09-20 RX ADMIN — HYDROXYZINE HYDROCHLORIDE 50 MG: 25 TABLET ORAL at 15:31

## 2024-09-20 RX ADMIN — NIFEDIPINE 30 MG: 30 TABLET, FILM COATED, EXTENDED RELEASE ORAL at 08:19

## 2024-09-20 RX ADMIN — IBUPROFEN 600 MG: 400 TABLET, FILM COATED ORAL at 15:31

## 2024-09-20 RX ADMIN — ARIPIPRAZOLE 5 MG: 10 TABLET ORAL at 20:12

## 2024-09-20 RX ADMIN — TOPIRAMATE 50 MG: 25 TABLET, FILM COATED ORAL at 11:40

## 2024-09-20 RX ADMIN — LIDOCAINE 1 PATCH: 4 PATCH TOPICAL at 08:19

## 2024-09-20 RX ADMIN — PANTOPRAZOLE SODIUM 40 MG: 40 TABLET, DELAYED RELEASE ORAL at 08:19

## 2024-09-20 RX ADMIN — ROPINIROLE HYDROCHLORIDE 0.5 MG: 0.25 TABLET, FILM COATED ORAL at 20:12

## 2024-09-20 RX ADMIN — NITROFURANTOIN MONOHYDRATE/MACROCRYSTALS 100 MG: 75; 25 CAPSULE ORAL at 20:12

## 2024-09-20 RX ADMIN — NITROFURANTOIN MONOHYDRATE/MACROCRYSTALS 100 MG: 75; 25 CAPSULE ORAL at 08:19

## 2024-09-20 RX ADMIN — BUPRENORPHINE AND NALOXONE 1.5 TABLET: 8; 2 TABLET SUBLINGUAL at 08:19
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
## 2024-09-20 NOTE — PLAN OF CARE
Goal Outcome Evaluation:  Plan of Care Reviewed With: patient  Patient Agreement with Plan of Care: agrees     Progress: no change  Outcome Evaluation: Patient reports sleeping and eating well. Rates A/D 10/10. Denies HI or hallucinations. Reports SI with no plan. Pt has slept about 8 hours this shift.
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
## 2024-09-21 PROCEDURE — 99232 SBSQ HOSP IP/OBS MODERATE 35: CPT | Performed by: PSYCHIATRY & NEUROLOGY

## 2024-09-21 RX ORDER — LIDOCAINE 4 G/G
1 PATCH TOPICAL
Status: DISCONTINUED | OUTPATIENT
Start: 2024-09-21 | End: 2024-09-25 | Stop reason: HOSPADM

## 2024-09-21 RX ADMIN — PANTOPRAZOLE SODIUM 40 MG: 40 TABLET, DELAYED RELEASE ORAL at 09:12

## 2024-09-21 RX ADMIN — NITROFURANTOIN MONOHYDRATE/MACROCRYSTALS 100 MG: 75; 25 CAPSULE ORAL at 09:07

## 2024-09-21 RX ADMIN — ROPINIROLE HYDROCHLORIDE 0.5 MG: 0.25 TABLET, FILM COATED ORAL at 20:13

## 2024-09-21 RX ADMIN — LIDOCAINE 1 PATCH: 4 PATCH TOPICAL at 16:54

## 2024-09-21 RX ADMIN — NIFEDIPINE 30 MG: 30 TABLET, FILM COATED, EXTENDED RELEASE ORAL at 09:12

## 2024-09-21 RX ADMIN — TOPIRAMATE 50 MG: 25 TABLET, FILM COATED ORAL at 20:14

## 2024-09-21 RX ADMIN — BUPRENORPHINE AND NALOXONE 1.5 TABLET: 8; 2 TABLET SUBLINGUAL at 09:09

## 2024-09-21 RX ADMIN — DULOXETINE HYDROCHLORIDE 90 MG: 30 CAPSULE, DELAYED RELEASE ORAL at 09:07

## 2024-09-21 RX ADMIN — ARIPIPRAZOLE 5 MG: 10 TABLET ORAL at 20:13

## 2024-09-21 RX ADMIN — LISINOPRIL 20 MG: 10 TABLET ORAL at 09:08

## 2024-09-21 RX ADMIN — NITROFURANTOIN MONOHYDRATE/MACROCRYSTALS 100 MG: 75; 25 CAPSULE ORAL at 20:13

## 2024-09-21 RX ADMIN — NICOTINE 1 PATCH: 21 PATCH TRANSDERMAL at 09:08

## 2024-09-21 RX ADMIN — HYDROXYZINE HYDROCHLORIDE 50 MG: 25 TABLET ORAL at 20:13

## 2024-09-21 RX ADMIN — PRAZOSIN HYDROCHLORIDE 4 MG: 1 CAPSULE ORAL at 20:13

## 2024-09-21 RX ADMIN — TOPIRAMATE 50 MG: 25 TABLET, FILM COATED ORAL at 09:08

## 2024-09-21 NOTE — PLAN OF CARE
Goal Outcome Evaluation:  Plan of Care Reviewed With: patient  Patient Agreement with Plan of Care: agrees     Progress: improving  Outcome Evaluation: Pt is calm and cooperative with staff. Pt reports good sleep and good appetite. Pt rates dep and anx 10/10. Pt denies SI/HI/AVH at this time.

## 2024-09-21 NOTE — PLAN OF CARE
Goal Outcome Evaluation:  Plan of Care Reviewed With: patient  Patient Agreement with Plan of Care: agrees     Progress: no change  Outcome Evaluation: Patient reports poor sleep and a good appetite. Rates A/D 9/10. Denies HI or hallucinations. Reports SI with no plan. Reports feeling helpless,hopeless and worhtless. Pt has slept about 9 hours this shift.
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
## 2024-09-22 PROCEDURE — 99232 SBSQ HOSP IP/OBS MODERATE 35: CPT | Performed by: PSYCHIATRY & NEUROLOGY

## 2024-09-22 RX ORDER — BUPRENORPHINE HYDROCHLORIDE AND NALOXONE HYDROCHLORIDE DIHYDRATE 8; 2 MG/1; MG/1
1.5 TABLET SUBLINGUAL DAILY
Status: DISCONTINUED | OUTPATIENT
Start: 2024-09-22 | End: 2024-09-25 | Stop reason: HOSPADM

## 2024-09-22 RX ADMIN — ROPINIROLE HYDROCHLORIDE 0.5 MG: 0.25 TABLET, FILM COATED ORAL at 20:27

## 2024-09-22 RX ADMIN — NITROFURANTOIN MONOHYDRATE/MACROCRYSTALS 100 MG: 75; 25 CAPSULE ORAL at 20:27

## 2024-09-22 RX ADMIN — NIFEDIPINE 30 MG: 30 TABLET, FILM COATED, EXTENDED RELEASE ORAL at 08:41

## 2024-09-22 RX ADMIN — NITROFURANTOIN MONOHYDRATE/MACROCRYSTALS 100 MG: 75; 25 CAPSULE ORAL at 08:41

## 2024-09-22 RX ADMIN — BUPRENORPHINE AND NALOXONE 1.5 TABLET: 8; 2 TABLET SUBLINGUAL at 08:41

## 2024-09-22 RX ADMIN — LISINOPRIL 20 MG: 10 TABLET ORAL at 08:41

## 2024-09-22 RX ADMIN — ARIPIPRAZOLE 5 MG: 10 TABLET ORAL at 20:27

## 2024-09-22 RX ADMIN — NICOTINE 1 PATCH: 21 PATCH TRANSDERMAL at 08:40

## 2024-09-22 RX ADMIN — TOPIRAMATE 50 MG: 25 TABLET, FILM COATED ORAL at 08:41

## 2024-09-22 RX ADMIN — TOPIRAMATE 50 MG: 25 TABLET, FILM COATED ORAL at 20:27

## 2024-09-22 RX ADMIN — PRAZOSIN HYDROCHLORIDE 4 MG: 1 CAPSULE ORAL at 20:27

## 2024-09-22 RX ADMIN — DULOXETINE HYDROCHLORIDE 90 MG: 30 CAPSULE, DELAYED RELEASE ORAL at 08:41

## 2024-09-22 RX ADMIN — PANTOPRAZOLE SODIUM 40 MG: 40 TABLET, DELAYED RELEASE ORAL at 08:41

## 2024-09-22 RX ADMIN — HYDROXYZINE HYDROCHLORIDE 50 MG: 25 TABLET ORAL at 20:27

## 2024-09-22 RX ADMIN — LIDOCAINE 1 PATCH: 4 PATCH TOPICAL at 17:13

## 2024-09-22 NOTE — PLAN OF CARE
"Goal Outcome Evaluation:  Plan of Care Reviewed With: patient  Patient Agreement with Plan of Care: agrees     Progress: improving  Outcome Evaluation: Pt has been calm and cooperative this shift. Pt rates anxiety and depression both 8/10. Pt denies SI/HI/AVH. Pt reports good appetite and \"pretty good\" sleep. Pt has been coming out of her room more and socializing with other patients. Pt has had no complaints at this time.                               "
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
## 2024-09-22 NOTE — PLAN OF CARE
Goal Outcome Evaluation:  Plan of Care Reviewed With: patient  Patient Agreement with Plan of Care: agrees     Progress: improving  Outcome Evaluation: Pt is calm and coopertive with staff. Pt reports fair sleep and appetite. Pt rates anxiety 10/10 and depression 10/10. Pt denies SI/HI/ AVH at this time. Pt reports having intermittent pain to back and legs, rates pain 8/10.

## 2024-09-23 PROCEDURE — 99232 SBSQ HOSP IP/OBS MODERATE 35: CPT | Performed by: PSYCHIATRY & NEUROLOGY

## 2024-09-23 RX ADMIN — HYDROXYZINE HYDROCHLORIDE 50 MG: 25 TABLET ORAL at 20:50

## 2024-09-23 RX ADMIN — NITROFURANTOIN MONOHYDRATE/MACROCRYSTALS 100 MG: 75; 25 CAPSULE ORAL at 20:44

## 2024-09-23 RX ADMIN — PRAZOSIN HYDROCHLORIDE 4 MG: 1 CAPSULE ORAL at 20:44

## 2024-09-23 RX ADMIN — ARIPIPRAZOLE 5 MG: 10 TABLET ORAL at 20:44

## 2024-09-23 RX ADMIN — LISINOPRIL 20 MG: 10 TABLET ORAL at 09:18

## 2024-09-23 RX ADMIN — NICOTINE 1 PATCH: 21 PATCH TRANSDERMAL at 09:21

## 2024-09-23 RX ADMIN — ROPINIROLE HYDROCHLORIDE 0.5 MG: 0.25 TABLET, FILM COATED ORAL at 20:44

## 2024-09-23 RX ADMIN — TOPIRAMATE 50 MG: 25 TABLET, FILM COATED ORAL at 09:17

## 2024-09-23 RX ADMIN — BUPRENORPHINE AND NALOXONE 1.5 TABLET: 8; 2 TABLET SUBLINGUAL at 09:22

## 2024-09-23 RX ADMIN — TOPIRAMATE 50 MG: 25 TABLET, FILM COATED ORAL at 20:44

## 2024-09-23 RX ADMIN — NITROFURANTOIN MONOHYDRATE/MACROCRYSTALS 100 MG: 75; 25 CAPSULE ORAL at 09:18

## 2024-09-23 RX ADMIN — DULOXETINE HYDROCHLORIDE 90 MG: 30 CAPSULE, DELAYED RELEASE ORAL at 11:05

## 2024-09-23 RX ADMIN — LIDOCAINE 1 PATCH: 4 PATCH TOPICAL at 16:42

## 2024-09-23 RX ADMIN — PANTOPRAZOLE SODIUM 40 MG: 40 TABLET, DELAYED RELEASE ORAL at 09:18

## 2024-09-23 RX ADMIN — NIFEDIPINE 30 MG: 30 TABLET, FILM COATED, EXTENDED RELEASE ORAL at 09:17

## 2024-09-23 NOTE — DISCHARGE INSTR - APPOINTMENTS
[facility]  [address], [place] [id number]  [phone]    Dr Corbin:  September 30 2024  at 1:30pm               <<-----Click here for Discharge Medication Review

## 2024-09-23 NOTE — PLAN OF CARE
Goal Outcome Evaluation:  Plan of Care Reviewed With: patient  Patient Agreement with Plan of Care: agrees     Progress: improving  Outcome Evaluation: Patient has been calm and cooperative this shift. Pt rates anxiety 10/10 and depression 10/10. Pt denies SI/HI/AVH. Pt reports having fair sleep and appetite. Pt has had no complaints this shift.
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
## 2024-09-24 PROCEDURE — 99232 SBSQ HOSP IP/OBS MODERATE 35: CPT | Performed by: PSYCHIATRY & NEUROLOGY

## 2024-09-24 RX ADMIN — BUPRENORPHINE AND NALOXONE 1.5 TABLET: 8; 2 TABLET SUBLINGUAL at 08:35

## 2024-09-24 RX ADMIN — LISINOPRIL 20 MG: 10 TABLET ORAL at 08:35

## 2024-09-24 RX ADMIN — ROPINIROLE HYDROCHLORIDE 0.5 MG: 0.25 TABLET, FILM COATED ORAL at 20:36

## 2024-09-24 RX ADMIN — NITROFURANTOIN MONOHYDRATE/MACROCRYSTALS 100 MG: 75; 25 CAPSULE ORAL at 08:35

## 2024-09-24 RX ADMIN — HYDROXYZINE HYDROCHLORIDE 50 MG: 25 TABLET ORAL at 20:41

## 2024-09-24 RX ADMIN — NITROFURANTOIN MONOHYDRATE/MACROCRYSTALS 100 MG: 75; 25 CAPSULE ORAL at 20:36

## 2024-09-24 RX ADMIN — TOPIRAMATE 50 MG: 25 TABLET, FILM COATED ORAL at 08:35

## 2024-09-24 RX ADMIN — TOPIRAMATE 50 MG: 25 TABLET, FILM COATED ORAL at 20:36

## 2024-09-24 RX ADMIN — NIFEDIPINE 30 MG: 30 TABLET, FILM COATED, EXTENDED RELEASE ORAL at 08:35

## 2024-09-24 RX ADMIN — NICOTINE 1 PATCH: 21 PATCH TRANSDERMAL at 08:34

## 2024-09-24 RX ADMIN — DULOXETINE HYDROCHLORIDE 90 MG: 30 CAPSULE, DELAYED RELEASE ORAL at 08:35

## 2024-09-24 RX ADMIN — ARIPIPRAZOLE 5 MG: 10 TABLET ORAL at 20:36

## 2024-09-24 RX ADMIN — PRAZOSIN HYDROCHLORIDE 4 MG: 1 CAPSULE ORAL at 20:36

## 2024-09-24 RX ADMIN — LIDOCAINE 1 PATCH: 4 PATCH TOPICAL at 16:59

## 2024-09-24 RX ADMIN — PANTOPRAZOLE SODIUM 40 MG: 40 TABLET, DELAYED RELEASE ORAL at 08:34

## 2024-09-24 NOTE — PLAN OF CARE
Goal Outcome Evaluation:  Plan of Care Reviewed With: patient  Patient Agreement with Plan of Care: agrees     Progress: improving     Pt reports good sleep and good appetite. Pt rates anx 8/10 and dep 10/10. Pt denies SI/HI/AVH.

## 2024-09-24 NOTE — PLAN OF CARE
Goal Outcome Evaluation:  Plan of Care Reviewed With: patient  Patient Agreement with Plan of Care: agrees     Progress: no change  Outcome Evaluation: Pt has been anxious but cooperative this shift. Pt reports good appetite but poor sleep. Pt rates anxeity and depression both 8/10. Pt denies SI/HI/AVH. Pt has had no complaints this shift.
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
## 2024-09-25 VITALS
BODY MASS INDEX: 42.82 KG/M2 | WEIGHT: 226.8 LBS | HEIGHT: 61 IN | TEMPERATURE: 97 F | HEART RATE: 102 BPM | SYSTOLIC BLOOD PRESSURE: 125 MMHG | OXYGEN SATURATION: 96 % | RESPIRATION RATE: 16 BRPM | DIASTOLIC BLOOD PRESSURE: 75 MMHG

## 2024-09-25 PROCEDURE — 99239 HOSP IP/OBS DSCHRG MGMT >30: CPT | Performed by: PSYCHIATRY & NEUROLOGY

## 2024-09-25 RX ORDER — PRAZOSIN HYDROCHLORIDE 2 MG/1
4 CAPSULE ORAL NIGHTLY
Qty: 60 CAPSULE | Refills: 0 | Status: SHIPPED | OUTPATIENT
Start: 2024-09-25

## 2024-09-25 RX ORDER — TOPIRAMATE 50 MG/1
50 TABLET, FILM COATED ORAL EVERY 12 HOURS SCHEDULED
Qty: 60 TABLET | Refills: 0 | Status: SHIPPED | OUTPATIENT
Start: 2024-09-25

## 2024-09-25 RX ORDER — NITROFURANTOIN 25; 75 MG/1; MG/1
100 CAPSULE ORAL EVERY 12 HOURS SCHEDULED
Qty: 1 CAPSULE | Refills: 0 | Status: SHIPPED | OUTPATIENT
Start: 2024-09-25 | End: 2024-09-26

## 2024-09-25 RX ORDER — ARIPIPRAZOLE 5 MG/1
5 TABLET ORAL NIGHTLY
Qty: 30 TABLET | Refills: 0 | Status: SHIPPED | OUTPATIENT
Start: 2024-09-25

## 2024-09-25 RX ADMIN — BUPRENORPHINE AND NALOXONE 1.5 TABLET: 8; 2 TABLET SUBLINGUAL at 09:13

## 2024-09-25 RX ADMIN — NIFEDIPINE 30 MG: 30 TABLET, FILM COATED, EXTENDED RELEASE ORAL at 09:14

## 2024-09-25 RX ADMIN — PANTOPRAZOLE SODIUM 40 MG: 40 TABLET, DELAYED RELEASE ORAL at 09:13

## 2024-09-25 RX ADMIN — NICOTINE 1 PATCH: 21 PATCH TRANSDERMAL at 09:14

## 2024-09-25 RX ADMIN — NITROFURANTOIN MONOHYDRATE/MACROCRYSTALS 100 MG: 75; 25 CAPSULE ORAL at 09:14

## 2024-09-25 RX ADMIN — ACETAMINOPHEN 650 MG: 325 TABLET ORAL at 07:08

## 2024-09-25 RX ADMIN — DULOXETINE HYDROCHLORIDE 90 MG: 30 CAPSULE, DELAYED RELEASE ORAL at 09:13

## 2024-09-25 RX ADMIN — TOPIRAMATE 50 MG: 25 TABLET, FILM COATED ORAL at 09:13

## 2024-09-25 RX ADMIN — LISINOPRIL 20 MG: 10 TABLET ORAL at 09:13
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
## 2024-09-25 NOTE — PLAN OF CARE
Goal Outcome Evaluation:  Plan of Care Reviewed With: patient  Patient Agreement with Plan of Care: agrees     Progress: improving     Pt reports good sleep and good appetite. Pt rates anx 10/10 and dep 10/10. Pt denies SI/HI/AVH.
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
## 2024-09-26 NOTE — PROGRESS NOTES
Behavioral Health Discharge Summary             Please fax within 24 hours of discharge to [place] at: [phone]      Member Name: Lanny Che Member ID: [id number]   Authorization Number: [id number] Phone: [phone]   Member Address: [place] [address]   Discharge Date: 09/25/2024 Level of Care at Discharge:    Facility: [facility] Staff Completing Form: Estrella SHIPMAN   If the member is being discharged directly to a residential or extended care program, please specify the type below.   __Private Child-Caring Facility (PCC) Residential/Group Home   __Private Child-Caring Facility (PCC) Therapeutic Foster Care   __Residential Treatment Facility (RTF)   __Psychiatric Residential Treatment Facility (PRTF I or II)   __Long-Term Acute Inpatient Hospital Services or Extended Care Unit (ECU)   __Other (please specify):    Brief discharge summary of treatment received (for follow up by the case management team): D/C clinical with list of medications and follow up appts given to patient upon discharge.     BRIEF SUMMARY OF RECOMMENDATIONS FOR ONGOING TREATMENT     Discharged to where: RETURN TO [facility] WITH SIGNIFICANT    Discharge diagnoses: f 33   Axis I:    Axis II:    Axis III:    Axis IV:    Axis V:    Does the member understand his/her DX?  Yes          Medication     Dose     Schedule Supply/  Quantity  Given at Discharge RX Provided  Yes/No  If Rx Provided, Quantity RX Prior Auth Required  Yes/No Prior Auth  Completed   ABILIFY  5 MG  1 TAB BY MOUTH NIGHTLY        PRAZOSIN  2 MG  2 TABS BY MOUTH AT NIGHT                                                                               Does the member understand the reason for taking these medications? Yes                                                           FOLLOW-UP APPOINTMENTS   Please schedule within 7 days of discharge and provide appointment details for all referred services.    PCP/Other  Providers Involved in Treatment:    Appointment Type: [facility]  Provider Name:   [facility]  Provider Phone:   [phone] Appointment Date: 09/30/2024 Appointment Time: 1:30 PM WITH DR. LORIE Chopra   (new to OP services)        Case Management    Is the member already enrolled in case management?  Yes/No  If yes, date the CM was notified:    If no, was the CM referral offered?  Yes/No  Accepted? Yes/No    Is the Release of Information in the chart? Yes/No:      Medication Management (for member discharged with psychiatric medications):      A&D Treatment (for member with substance abuse/   dependence in the past year):      Medical Condition (for member with a medical condition):    Other recommended treatment:    Do you have any concerns about the discharge plan?  No    If yes, explain:    Was the member involved in the discharge planning?  Yes    If no, explain:    Was a copy of the discharge plan provided to the member?  Yes    If no, explain:

## 2024-10-02 ENCOUNTER — HOSPITAL ENCOUNTER (EMERGENCY)
Facility: HOSPITAL | Age: 54
Discharge: HOME | End: 2024-10-02
Attending: STUDENT IN AN ORGANIZED HEALTH CARE EDUCATION/TRAINING PROGRAM
Payer: COMMERCIAL

## 2024-10-02 ENCOUNTER — APPOINTMENT (OUTPATIENT)
Dept: CT IMAGING | Facility: HOSPITAL | Age: 54
End: 2024-10-02
Payer: COMMERCIAL

## 2024-10-02 VITALS
TEMPERATURE: 97.9 F | HEART RATE: 112 BPM | BODY MASS INDEX: 43.05 KG/M2 | DIASTOLIC BLOOD PRESSURE: 80 MMHG | WEIGHT: 228 LBS | RESPIRATION RATE: 16 BRPM | SYSTOLIC BLOOD PRESSURE: 117 MMHG | OXYGEN SATURATION: 95 % | HEIGHT: 61 IN

## 2024-10-02 DIAGNOSIS — K59.00 CONSTIPATION, UNSPECIFIED CONSTIPATION TYPE: ICD-10-CM

## 2024-10-02 DIAGNOSIS — M54.50 ACUTE LEFT-SIDED LOW BACK PAIN WITHOUT SCIATICA: Primary | ICD-10-CM

## 2024-10-02 LAB
ALBUMIN SERPL-MCNC: 3.7 G/DL (ref 3.5–5.2)
ALBUMIN/GLOB SERPL: 1 G/DL
ALP SERPL-CCNC: 113 U/L (ref 39–117)
ALT SERPL W P-5'-P-CCNC: 40 U/L (ref 1–33)
AMPHET+METHAMPHET UR QL: NEGATIVE
AMPHETAMINES UR QL: NEGATIVE
ANION GAP SERPL CALCULATED.3IONS-SCNC: 11 MMOL/L (ref 5–15)
AST SERPL-CCNC: 28 U/L (ref 1–32)
BARBITURATES UR QL SCN: NEGATIVE
BASOPHILS # BLD AUTO: 0.04 10*3/MM3 (ref 0–0.2)
BASOPHILS NFR BLD AUTO: 0.4 % (ref 0–1.5)
BENZODIAZ UR QL SCN: NEGATIVE
BILIRUB SERPL-MCNC: 0.3 MG/DL (ref 0–1.2)
BILIRUB UR QL STRIP: NEGATIVE
BUN SERPL-MCNC: 7 MG/DL (ref 6–20)
BUN/CREAT SERPL: 9.3 (ref 7–25)
BUPRENORPHINE SERPL-MCNC: POSITIVE NG/ML
CALCIUM SPEC-SCNC: 8.8 MG/DL (ref 8.6–10.5)
CANNABINOIDS SERPL QL: NEGATIVE
CHLORIDE SERPL-SCNC: 106 MMOL/L (ref 98–107)
CLARITY UR: CLEAR
CO2 SERPL-SCNC: 20 MMOL/L (ref 22–29)
COCAINE UR QL: NEGATIVE
COLOR UR: YELLOW
CREAT SERPL-MCNC: 0.75 MG/DL (ref 0.57–1)
CRP SERPL-MCNC: 0.98 MG/DL (ref 0–0.5)
D-LACTATE SERPL-SCNC: 1.2 MMOL/L (ref 0.5–2)
DEPRECATED RDW RBC AUTO: 50.6 FL (ref 37–54)
EGFRCR SERPLBLD CKD-EPI 2021: 95.9 ML/MIN/1.73
EOSINOPHIL # BLD AUTO: 0.47 10*3/MM3 (ref 0–0.4)
EOSINOPHIL NFR BLD AUTO: 4.5 % (ref 0.3–6.2)
ERYTHROCYTE [DISTWIDTH] IN BLOOD BY AUTOMATED COUNT: 14.8 % (ref 12.3–15.4)
FENTANYL UR-MCNC: NEGATIVE NG/ML
GLOBULIN UR ELPH-MCNC: 3.6 GM/DL
GLUCOSE SERPL-MCNC: 123 MG/DL (ref 65–99)
GLUCOSE UR STRIP-MCNC: NEGATIVE MG/DL
HCG SERPL QL: NEGATIVE
HCT VFR BLD AUTO: 38.6 % (ref 34–46.6)
HGB BLD-MCNC: 12.2 G/DL (ref 12–15.9)
HGB UR QL STRIP.AUTO: NEGATIVE
HOLD SPECIMEN: NORMAL
IMM GRANULOCYTES # BLD AUTO: 0.03 10*3/MM3 (ref 0–0.05)
IMM GRANULOCYTES NFR BLD AUTO: 0.3 % (ref 0–0.5)
KETONES UR QL STRIP: NEGATIVE
LEUKOCYTE ESTERASE UR QL STRIP.AUTO: NEGATIVE
LYMPHOCYTES # BLD AUTO: 1.46 10*3/MM3 (ref 0.7–3.1)
LYMPHOCYTES NFR BLD AUTO: 14.1 % (ref 19.6–45.3)
MCH RBC QN AUTO: 29.3 PG (ref 26.6–33)
MCHC RBC AUTO-ENTMCNC: 31.6 G/DL (ref 31.5–35.7)
MCV RBC AUTO: 92.6 FL (ref 79–97)
METHADONE UR QL SCN: NEGATIVE
MONOCYTES # BLD AUTO: 0.66 10*3/MM3 (ref 0.1–0.9)
MONOCYTES NFR BLD AUTO: 6.4 % (ref 5–12)
NEUTROPHILS NFR BLD AUTO: 7.73 10*3/MM3 (ref 1.7–7)
NEUTROPHILS NFR BLD AUTO: 74.3 % (ref 42.7–76)
NITRITE UR QL STRIP: NEGATIVE
NRBC BLD AUTO-RTO: 0 /100 WBC (ref 0–0.2)
OPIATES UR QL: NEGATIVE
OXYCODONE UR QL SCN: NEGATIVE
PCP UR QL SCN: NEGATIVE
PH UR STRIP.AUTO: 5.5 [PH] (ref 5–8)
PLATELET # BLD AUTO: 216 10*3/MM3 (ref 140–450)
PMV BLD AUTO: 10.2 FL (ref 6–12)
POTASSIUM SERPL-SCNC: 3.6 MMOL/L (ref 3.5–5.2)
PROT SERPL-MCNC: 7.3 G/DL (ref 6–8.5)
PROT UR QL STRIP: NEGATIVE
RBC # BLD AUTO: 4.17 10*6/MM3 (ref 3.77–5.28)
SODIUM SERPL-SCNC: 137 MMOL/L (ref 136–145)
SP GR UR STRIP: 1.01 (ref 1–1.03)
TRICYCLICS UR QL SCN: NEGATIVE
UROBILINOGEN UR QL STRIP: NORMAL
WBC NRBC COR # BLD AUTO: 10.39 10*3/MM3 (ref 3.4–10.8)
WHOLE BLOOD HOLD SPECIMEN: NORMAL

## 2024-10-02 PROCEDURE — 74176 CT ABD & PELVIS W/O CONTRAST: CPT | Performed by: RADIOLOGY

## 2024-10-02 PROCEDURE — 72131 CT LUMBAR SPINE W/O DYE: CPT

## 2024-10-02 PROCEDURE — 81003 URINALYSIS AUTO W/O SCOPE: CPT | Performed by: NURSE PRACTITIONER

## 2024-10-02 PROCEDURE — 85025 COMPLETE CBC W/AUTO DIFF WBC: CPT | Performed by: NURSE PRACTITIONER

## 2024-10-02 PROCEDURE — 84703 CHORIONIC GONADOTROPIN ASSAY: CPT | Performed by: NURSE PRACTITIONER

## 2024-10-02 PROCEDURE — 80307 DRUG TEST PRSMV CHEM ANLYZR: CPT | Performed by: NURSE PRACTITIONER

## 2024-10-02 PROCEDURE — 86140 C-REACTIVE PROTEIN: CPT | Performed by: NURSE PRACTITIONER

## 2024-10-02 PROCEDURE — 74176 CT ABD & PELVIS W/O CONTRAST: CPT

## 2024-10-02 PROCEDURE — 80053 COMPREHEN METABOLIC PANEL: CPT | Performed by: NURSE PRACTITIONER

## 2024-10-02 PROCEDURE — 36415 COLL VENOUS BLD VENIPUNCTURE: CPT

## 2024-10-02 PROCEDURE — 99284 EMERGENCY DEPT VISIT MOD MDM: CPT

## 2024-10-02 PROCEDURE — 87040 BLOOD CULTURE FOR BACTERIA: CPT | Performed by: NURSE PRACTITIONER

## 2024-10-02 PROCEDURE — 25010000002 KETOROLAC TROMETHAMINE PER 15 MG: Performed by: NURSE PRACTITIONER

## 2024-10-02 PROCEDURE — 83605 ASSAY OF LACTIC ACID: CPT | Performed by: NURSE PRACTITIONER

## 2024-10-02 PROCEDURE — 96374 THER/PROPH/DIAG INJ IV PUSH: CPT

## 2024-10-02 PROCEDURE — 72131 CT LUMBAR SPINE W/O DYE: CPT | Performed by: RADIOLOGY

## 2024-10-02 RX ORDER — SODIUM CHLORIDE 0.9 % (FLUSH) 0.9 %
10 SYRINGE (ML) INJECTION AS NEEDED
Status: DISCONTINUED | OUTPATIENT
Start: 2024-10-02 | End: 2024-10-02 | Stop reason: HOSPADM

## 2024-10-02 RX ORDER — KETOROLAC TROMETHAMINE 30 MG/ML
30 INJECTION, SOLUTION INTRAMUSCULAR; INTRAVENOUS ONCE
Status: COMPLETED | OUTPATIENT
Start: 2024-10-02 | End: 2024-10-02

## 2024-10-02 RX ADMIN — KETOROLAC TROMETHAMINE 30 MG: 30 INJECTION, SOLUTION INTRAMUSCULAR; INTRAVENOUS at 16:28

## 2024-10-02 NOTE — ED NOTES
Dc assessment completed no acute distress noted skin warm pink and dry. Reviewed all dc instructions pt voiced understanding
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
## 2024-10-06 ENCOUNTER — HOSPITAL ENCOUNTER (EMERGENCY)
Facility: HOSPITAL | Age: 54
Discharge: PSYCHIATRIC OR SUBSTANCE USE FACILITY | DRG: 885 | End: 2024-10-06
Attending: STUDENT IN AN ORGANIZED HEALTH CARE EDUCATION/TRAINING PROGRAM | Admitting: STUDENT IN AN ORGANIZED HEALTH CARE EDUCATION/TRAINING PROGRAM
Payer: COMMERCIAL

## 2024-10-06 ENCOUNTER — HOSPITAL ENCOUNTER (INPATIENT)
Facility: HOSPITAL | Age: 54
LOS: 8 days | Discharge: HOME | DRG: 885 | End: 2024-10-14
Attending: PSYCHIATRY & NEUROLOGY | Admitting: PSYCHIATRY & NEUROLOGY
Payer: COMMERCIAL

## 2024-10-06 VITALS
HEIGHT: 61 IN | BODY MASS INDEX: 42.67 KG/M2 | WEIGHT: 226 LBS | SYSTOLIC BLOOD PRESSURE: 148 MMHG | DIASTOLIC BLOOD PRESSURE: 103 MMHG | TEMPERATURE: 98.2 F | OXYGEN SATURATION: 98 % | HEART RATE: 100 BPM | RESPIRATION RATE: 18 BRPM

## 2024-10-06 DIAGNOSIS — R45.851 SUICIDAL IDEATIONS: Primary | ICD-10-CM

## 2024-10-06 LAB
ALBUMIN SERPL-MCNC: 3.9 G/DL (ref 3.5–5.2)
ALBUMIN/GLOB SERPL: 1 G/DL
ALP SERPL-CCNC: 119 U/L (ref 39–117)
ALT SERPL W P-5'-P-CCNC: 36 U/L (ref 1–33)
AMPHET+METHAMPHET UR QL: NEGATIVE
AMPHETAMINES UR QL: NEGATIVE
ANION GAP SERPL CALCULATED.3IONS-SCNC: 8.7 MMOL/L (ref 5–15)
AST SERPL-CCNC: 24 U/L (ref 1–32)
BARBITURATES UR QL SCN: NEGATIVE
BASOPHILS # BLD AUTO: 0.03 10*3/MM3 (ref 0–0.2)
BASOPHILS NFR BLD AUTO: 0.4 % (ref 0–1.5)
BENZODIAZ UR QL SCN: NEGATIVE
BILIRUB SERPL-MCNC: <0.2 MG/DL (ref 0–1.2)
BILIRUB UR QL STRIP: NEGATIVE
BUN SERPL-MCNC: 7 MG/DL (ref 6–20)
BUN/CREAT SERPL: 9 (ref 7–25)
BUPRENORPHINE SERPL-MCNC: POSITIVE NG/ML
CALCIUM SPEC-SCNC: 8.9 MG/DL (ref 8.6–10.5)
CANNABINOIDS SERPL QL: NEGATIVE
CHLORIDE SERPL-SCNC: 111 MMOL/L (ref 98–107)
CLARITY UR: CLEAR
CO2 SERPL-SCNC: 23.3 MMOL/L (ref 22–29)
COCAINE UR QL: NEGATIVE
COLOR UR: YELLOW
CREAT SERPL-MCNC: 0.78 MG/DL (ref 0.57–1)
DEPRECATED RDW RBC AUTO: 48 FL (ref 37–54)
EGFRCR SERPLBLD CKD-EPI 2021: 91.5 ML/MIN/1.73
EOSINOPHIL # BLD AUTO: 0.36 10*3/MM3 (ref 0–0.4)
EOSINOPHIL NFR BLD AUTO: 4.6 % (ref 0.3–6.2)
ERYTHROCYTE [DISTWIDTH] IN BLOOD BY AUTOMATED COUNT: 14.6 % (ref 12.3–15.4)
ETHANOL BLD-MCNC: <10 MG/DL (ref 0–10)
ETHANOL UR QL: <0.01 %
FENTANYL UR-MCNC: NEGATIVE NG/ML
GLOBULIN UR ELPH-MCNC: 3.8 GM/DL
GLUCOSE SERPL-MCNC: 101 MG/DL (ref 65–99)
GLUCOSE UR STRIP-MCNC: NEGATIVE MG/DL
HAV IGM SERPL QL IA: ABNORMAL
HBV CORE IGM SERPL QL IA: REACTIVE
HBV SURFACE AG SERPL QL IA: ABNORMAL
HCG SERPL QL: NEGATIVE
HCT VFR BLD AUTO: 37.2 % (ref 34–46.6)
HCV AB SER QL: ABNORMAL
HGB BLD-MCNC: 12 G/DL (ref 12–15.9)
HGB UR QL STRIP.AUTO: NEGATIVE
IMM GRANULOCYTES # BLD AUTO: 0.06 10*3/MM3 (ref 0–0.05)
IMM GRANULOCYTES NFR BLD AUTO: 0.8 % (ref 0–0.5)
KETONES UR QL STRIP: NEGATIVE
LEUKOCYTE ESTERASE UR QL STRIP.AUTO: NEGATIVE
LYMPHOCYTES # BLD AUTO: 1.35 10*3/MM3 (ref 0.7–3.1)
LYMPHOCYTES NFR BLD AUTO: 17.4 % (ref 19.6–45.3)
MAGNESIUM SERPL-MCNC: 2.1 MG/DL (ref 1.6–2.6)
MCH RBC QN AUTO: 29.1 PG (ref 26.6–33)
MCHC RBC AUTO-ENTMCNC: 32.3 G/DL (ref 31.5–35.7)
MCV RBC AUTO: 90.1 FL (ref 79–97)
METHADONE UR QL SCN: NEGATIVE
MONOCYTES # BLD AUTO: 0.43 10*3/MM3 (ref 0.1–0.9)
MONOCYTES NFR BLD AUTO: 5.5 % (ref 5–12)
NEUTROPHILS NFR BLD AUTO: 5.54 10*3/MM3 (ref 1.7–7)
NEUTROPHILS NFR BLD AUTO: 71.3 % (ref 42.7–76)
NITRITE UR QL STRIP: NEGATIVE
NRBC BLD AUTO-RTO: 0 /100 WBC (ref 0–0.2)
OPIATES UR QL: NEGATIVE
OXYCODONE UR QL SCN: NEGATIVE
PCP UR QL SCN: NEGATIVE
PH UR STRIP.AUTO: 8 [PH] (ref 5–8)
PLATELET # BLD AUTO: 247 10*3/MM3 (ref 140–450)
PMV BLD AUTO: 9.7 FL (ref 6–12)
POTASSIUM SERPL-SCNC: 4.1 MMOL/L (ref 3.5–5.2)
PROT SERPL-MCNC: 7.7 G/DL (ref 6–8.5)
PROT UR QL STRIP: NEGATIVE
RBC # BLD AUTO: 4.13 10*6/MM3 (ref 3.77–5.28)
SODIUM SERPL-SCNC: 143 MMOL/L (ref 136–145)
SP GR UR STRIP: 1.01 (ref 1–1.03)
TRICYCLICS UR QL SCN: NEGATIVE
UROBILINOGEN UR QL STRIP: NORMAL
WBC NRBC COR # BLD AUTO: 7.77 10*3/MM3 (ref 3.4–10.8)

## 2024-10-06 PROCEDURE — 80074 ACUTE HEPATITIS PANEL: CPT | Performed by: PSYCHIATRY & NEUROLOGY

## 2024-10-06 PROCEDURE — 80307 DRUG TEST PRSMV CHEM ANLYZR: CPT | Performed by: STUDENT IN AN ORGANIZED HEALTH CARE EDUCATION/TRAINING PROGRAM

## 2024-10-06 PROCEDURE — 93010 ELECTROCARDIOGRAM REPORT: CPT | Performed by: INTERNAL MEDICINE

## 2024-10-06 PROCEDURE — 83735 ASSAY OF MAGNESIUM: CPT | Performed by: STUDENT IN AN ORGANIZED HEALTH CARE EDUCATION/TRAINING PROGRAM

## 2024-10-06 PROCEDURE — 80053 COMPREHEN METABOLIC PANEL: CPT | Performed by: STUDENT IN AN ORGANIZED HEALTH CARE EDUCATION/TRAINING PROGRAM

## 2024-10-06 PROCEDURE — 93005 ELECTROCARDIOGRAM TRACING: CPT | Performed by: STUDENT IN AN ORGANIZED HEALTH CARE EDUCATION/TRAINING PROGRAM

## 2024-10-06 PROCEDURE — 36415 COLL VENOUS BLD VENIPUNCTURE: CPT

## 2024-10-06 PROCEDURE — 81003 URINALYSIS AUTO W/O SCOPE: CPT | Performed by: STUDENT IN AN ORGANIZED HEALTH CARE EDUCATION/TRAINING PROGRAM

## 2024-10-06 PROCEDURE — 99285 EMERGENCY DEPT VISIT HI MDM: CPT

## 2024-10-06 PROCEDURE — 82077 ASSAY SPEC XCP UR&BREATH IA: CPT | Performed by: STUDENT IN AN ORGANIZED HEALTH CARE EDUCATION/TRAINING PROGRAM

## 2024-10-06 PROCEDURE — 84703 CHORIONIC GONADOTROPIN ASSAY: CPT | Performed by: STUDENT IN AN ORGANIZED HEALTH CARE EDUCATION/TRAINING PROGRAM

## 2024-10-06 PROCEDURE — 85025 COMPLETE CBC W/AUTO DIFF WBC: CPT | Performed by: STUDENT IN AN ORGANIZED HEALTH CARE EDUCATION/TRAINING PROGRAM

## 2024-10-06 RX ORDER — PANTOPRAZOLE SODIUM 40 MG/1
40 TABLET, DELAYED RELEASE ORAL DAILY
Status: DISCONTINUED | OUTPATIENT
Start: 2024-10-07 | End: 2024-10-14 | Stop reason: HOSPADM

## 2024-10-06 RX ORDER — IBUPROFEN 400 MG/1
800 TABLET, FILM COATED ORAL EVERY 6 HOURS PRN
Status: CANCELLED | OUTPATIENT
Start: 2024-10-06

## 2024-10-06 RX ORDER — HYDROXYZINE HYDROCHLORIDE 25 MG/1
50 TABLET, FILM COATED ORAL EVERY 6 HOURS PRN
Status: DISCONTINUED | OUTPATIENT
Start: 2024-10-06 | End: 2024-10-14 | Stop reason: HOSPADM

## 2024-10-06 RX ORDER — TOPIRAMATE 25 MG/1
50 TABLET, FILM COATED ORAL EVERY 12 HOURS SCHEDULED
Status: DISCONTINUED | OUTPATIENT
Start: 2024-10-06 | End: 2024-10-14 | Stop reason: HOSPADM

## 2024-10-06 RX ORDER — ALUMINA, MAGNESIA, AND SIMETHICONE 2400; 2400; 240 MG/30ML; MG/30ML; MG/30ML
15 SUSPENSION ORAL EVERY 6 HOURS PRN
Status: DISCONTINUED | OUTPATIENT
Start: 2024-10-06 | End: 2024-10-14 | Stop reason: HOSPADM

## 2024-10-06 RX ORDER — DULOXETIN HYDROCHLORIDE 30 MG/1
90 CAPSULE, DELAYED RELEASE ORAL DAILY
Status: DISCONTINUED | OUTPATIENT
Start: 2024-10-07 | End: 2024-10-14 | Stop reason: HOSPADM

## 2024-10-06 RX ORDER — ECHINACEA PURPUREA EXTRACT 125 MG
2 TABLET ORAL AS NEEDED
Status: DISCONTINUED | OUTPATIENT
Start: 2024-10-06 | End: 2024-10-14 | Stop reason: HOSPADM

## 2024-10-06 RX ORDER — LOPERAMIDE HCL 2 MG
2 CAPSULE ORAL
Status: DISCONTINUED | OUTPATIENT
Start: 2024-10-06 | End: 2024-10-14 | Stop reason: HOSPADM

## 2024-10-06 RX ORDER — IBUPROFEN 200 MG
800 TABLET ORAL EVERY 6 HOURS PRN
COMMUNITY

## 2024-10-06 RX ORDER — ACETAMINOPHEN 325 MG/1
650 TABLET ORAL EVERY 6 HOURS PRN
Status: CANCELLED | OUTPATIENT
Start: 2024-10-06

## 2024-10-06 RX ORDER — NIFEDIPINE 30 MG/1
30 TABLET, EXTENDED RELEASE ORAL DAILY
Status: DISCONTINUED | OUTPATIENT
Start: 2024-10-07 | End: 2024-10-14 | Stop reason: HOSPADM

## 2024-10-06 RX ORDER — FAMOTIDINE 20 MG/1
20 TABLET, FILM COATED ORAL 2 TIMES DAILY PRN
Status: DISCONTINUED | OUTPATIENT
Start: 2024-10-06 | End: 2024-10-14 | Stop reason: HOSPADM

## 2024-10-06 RX ORDER — ACETAMINOPHEN 325 MG/1
650 TABLET ORAL EVERY 6 HOURS PRN
Status: DISCONTINUED | OUTPATIENT
Start: 2024-10-06 | End: 2024-10-14 | Stop reason: HOSPADM

## 2024-10-06 RX ORDER — POLYETHYLENE GLYCOL 3350 17 G/17G
17 POWDER, FOR SOLUTION ORAL DAILY PRN
Status: DISCONTINUED | OUTPATIENT
Start: 2024-10-06 | End: 2024-10-14 | Stop reason: HOSPADM

## 2024-10-06 RX ORDER — IBUPROFEN 400 MG/1
400 TABLET, FILM COATED ORAL EVERY 6 HOURS PRN
Status: DISCONTINUED | OUTPATIENT
Start: 2024-10-06 | End: 2024-10-14 | Stop reason: HOSPADM

## 2024-10-06 RX ORDER — ROPINIROLE 0.25 MG/1
0.5 TABLET, FILM COATED ORAL NIGHTLY
Status: DISCONTINUED | OUTPATIENT
Start: 2024-10-06 | End: 2024-10-14 | Stop reason: HOSPADM

## 2024-10-06 RX ORDER — BENZTROPINE MESYLATE 1 MG/ML
1 INJECTION, SOLUTION INTRAMUSCULAR; INTRAVENOUS ONCE AS NEEDED
Status: DISCONTINUED | OUTPATIENT
Start: 2024-10-06 | End: 2024-10-14 | Stop reason: HOSPADM

## 2024-10-06 RX ORDER — BENZONATATE 100 MG/1
100 CAPSULE ORAL 3 TIMES DAILY PRN
Status: DISCONTINUED | OUTPATIENT
Start: 2024-10-06 | End: 2024-10-14 | Stop reason: HOSPADM

## 2024-10-06 RX ORDER — CHLORHEXIDINE GLUCONATE ORAL RINSE 1.2 MG/ML
15 SOLUTION DENTAL 2 TIMES DAILY
Status: ON HOLD | COMMUNITY
End: 2024-10-06

## 2024-10-06 RX ORDER — ARIPIPRAZOLE 10 MG/1
5 TABLET ORAL NIGHTLY
Status: DISCONTINUED | OUTPATIENT
Start: 2024-10-06 | End: 2024-10-14 | Stop reason: HOSPADM

## 2024-10-06 RX ORDER — ACETAMINOPHEN 325 MG/1
650 TABLET ORAL EVERY 6 HOURS PRN
COMMUNITY

## 2024-10-06 RX ORDER — BUPRENORPHINE HYDROCHLORIDE AND NALOXONE HYDROCHLORIDE DIHYDRATE 8; 2 MG/1; MG/1
1.5 TABLET SUBLINGUAL DAILY
Status: DISCONTINUED | OUTPATIENT
Start: 2024-10-07 | End: 2024-10-14 | Stop reason: HOSPADM

## 2024-10-06 RX ORDER — PRAZOSIN HYDROCHLORIDE 1 MG/1
4 CAPSULE ORAL NIGHTLY
Status: DISCONTINUED | OUTPATIENT
Start: 2024-10-06 | End: 2024-10-14 | Stop reason: HOSPADM

## 2024-10-06 RX ORDER — ONDANSETRON 4 MG/1
4 TABLET, ORALLY DISINTEGRATING ORAL EVERY 6 HOURS PRN
Status: DISCONTINUED | OUTPATIENT
Start: 2024-10-06 | End: 2024-10-14 | Stop reason: HOSPADM

## 2024-10-06 RX ORDER — LISINOPRIL 10 MG/1
20 TABLET ORAL DAILY
Status: DISCONTINUED | OUTPATIENT
Start: 2024-10-07 | End: 2024-10-14 | Stop reason: HOSPADM

## 2024-10-06 RX ORDER — HYDROXYZINE HYDROCHLORIDE 25 MG/1
25 TABLET, FILM COATED ORAL EVERY 8 HOURS PRN
Status: CANCELLED | OUTPATIENT
Start: 2024-10-06

## 2024-10-06 RX ORDER — BENZTROPINE MESYLATE 1 MG/1
2 TABLET ORAL ONCE AS NEEDED
Status: DISCONTINUED | OUTPATIENT
Start: 2024-10-06 | End: 2024-10-14 | Stop reason: HOSPADM

## 2024-10-06 RX ADMIN — ROPINIROLE HYDROCHLORIDE 0.5 MG: 0.25 TABLET, FILM COATED ORAL at 21:55

## 2024-10-06 RX ADMIN — ARIPIPRAZOLE 5 MG: 10 TABLET ORAL at 21:55

## 2024-10-06 RX ADMIN — PRAZOSIN HYDROCHLORIDE 4 MG: 1 CAPSULE ORAL at 21:55

## 2024-10-06 RX ADMIN — TOPIRAMATE 50 MG: 25 TABLET, FILM COATED ORAL at 21:56

## 2024-10-07 PROBLEM — I10 HTN (HYPERTENSION): Status: ACTIVE | Noted: 2024-10-07

## 2024-10-07 PROBLEM — F33.3 MDD (MAJOR DEPRESSIVE DISORDER), RECURRENT, SEVERE, WITH PSYCHOSIS: Status: ACTIVE | Noted: 2024-10-07

## 2024-10-07 PROBLEM — F43.10 PTSD (POST-TRAUMATIC STRESS DISORDER): Status: ACTIVE | Noted: 2024-10-07

## 2024-10-07 LAB
BACTERIA SPEC AEROBE CULT: NORMAL
BACTERIA SPEC AEROBE CULT: NORMAL

## 2024-10-07 PROCEDURE — 99223 1ST HOSP IP/OBS HIGH 75: CPT | Performed by: PSYCHIATRY & NEUROLOGY

## 2024-10-07 RX ORDER — NICOTINE 21 MG/24HR
1 PATCH, TRANSDERMAL 24 HOURS TRANSDERMAL
Status: DISCONTINUED | OUTPATIENT
Start: 2024-10-07 | End: 2024-10-14 | Stop reason: HOSPADM

## 2024-10-07 RX ADMIN — LISINOPRIL 20 MG: 10 TABLET ORAL at 08:56

## 2024-10-07 RX ADMIN — NICOTINE 1 PATCH: 21 PATCH TRANSDERMAL at 21:34

## 2024-10-07 RX ADMIN — TOPIRAMATE 50 MG: 25 TABLET, FILM COATED ORAL at 20:55

## 2024-10-07 RX ADMIN — PANTOPRAZOLE SODIUM 40 MG: 40 TABLET, DELAYED RELEASE ORAL at 08:52

## 2024-10-07 RX ADMIN — ARIPIPRAZOLE 5 MG: 10 TABLET ORAL at 20:56

## 2024-10-07 RX ADMIN — BUPRENORPHINE HYDROCHLORIDE AND NALOXONE HYDROCHLORIDE 1.5 TABLET: 8; 2 TABLET SUBLINGUAL at 08:51

## 2024-10-07 RX ADMIN — NIFEDIPINE 30 MG: 30 TABLET, FILM COATED, EXTENDED RELEASE ORAL at 08:55

## 2024-10-07 RX ADMIN — ACETAMINOPHEN 650 MG: 325 TABLET ORAL at 21:00

## 2024-10-07 RX ADMIN — PRAZOSIN HYDROCHLORIDE 4 MG: 1 CAPSULE ORAL at 20:56

## 2024-10-07 RX ADMIN — HYDROXYZINE HYDROCHLORIDE 50 MG: 25 TABLET ORAL at 06:41

## 2024-10-07 RX ADMIN — ROPINIROLE HYDROCHLORIDE 0.5 MG: 0.25 TABLET, FILM COATED ORAL at 20:55

## 2024-10-07 RX ADMIN — IBUPROFEN 400 MG: 400 TABLET, FILM COATED ORAL at 06:37

## 2024-10-07 RX ADMIN — TOPIRAMATE 50 MG: 25 TABLET, FILM COATED ORAL at 08:55

## 2024-10-07 RX ADMIN — DULOXETINE HYDROCHLORIDE 90 MG: 30 CAPSULE, DELAYED RELEASE ORAL at 08:53
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
## 2024-10-07 NOTE — NURSING NOTE
Spoke to Dr. Blackman, discussed assessment and labs, new orders to admit the patient to senior with routine orders SP3.  [id number]
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
## 2024-10-07 NOTE — PLAN OF CARE
Goal Outcome Evaluation:  Plan of Care Reviewed With: patient  Patient Agreement with Plan of Care: agrees     Progress: improving  Outcome Evaluation: Pt is calm and cooperative with staff. Pt reports good sleep and good appetite. Pt rates dep and anx 3/3. Pt denies SI/HI/AVH at this time.

## 2024-10-07 NOTE — PLAN OF CARE
Goal Outcome Evaluation:  Plan of Care Reviewed With: patient  Patient Agreement with Plan of Care: agrees        New admission.  Care plan initiated.
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
## 2024-10-07 NOTE — PLAN OF CARE
Goal Outcome Evaluation:  Plan of Care Reviewed With: patient  Patient Agreement with Plan of Care: agrees     Progress: no change  Outcome Evaluation: New admission this shift. No complaints reported. Went to bed after admission completed. Has slept approximately 7 hours at this point in shift.

## 2024-10-08 LAB
QT INTERVAL: 346 MS
QTC INTERVAL: 434 MS

## 2024-10-08 PROCEDURE — 99232 SBSQ HOSP IP/OBS MODERATE 35: CPT | Performed by: PSYCHIATRY & NEUROLOGY

## 2024-10-08 RX ADMIN — ROPINIROLE HYDROCHLORIDE 0.5 MG: 0.25 TABLET, FILM COATED ORAL at 20:20

## 2024-10-08 RX ADMIN — HYDROXYZINE HYDROCHLORIDE 50 MG: 25 TABLET ORAL at 17:56

## 2024-10-08 RX ADMIN — NICOTINE 1 PATCH: 21 PATCH TRANSDERMAL at 08:48

## 2024-10-08 RX ADMIN — ACETAMINOPHEN 650 MG: 325 TABLET ORAL at 20:25

## 2024-10-08 RX ADMIN — PANTOPRAZOLE SODIUM 40 MG: 40 TABLET, DELAYED RELEASE ORAL at 08:45

## 2024-10-08 RX ADMIN — NIFEDIPINE 30 MG: 30 TABLET, FILM COATED, EXTENDED RELEASE ORAL at 08:45

## 2024-10-08 RX ADMIN — BUPRENORPHINE HYDROCHLORIDE AND NALOXONE HYDROCHLORIDE 1.5 TABLET: 8; 2 TABLET SUBLINGUAL at 08:49

## 2024-10-08 RX ADMIN — DULOXETINE HYDROCHLORIDE 90 MG: 30 CAPSULE, DELAYED RELEASE ORAL at 08:45

## 2024-10-08 RX ADMIN — ARIPIPRAZOLE 5 MG: 10 TABLET ORAL at 20:20

## 2024-10-08 RX ADMIN — LISINOPRIL 20 MG: 10 TABLET ORAL at 08:44

## 2024-10-08 RX ADMIN — TOPIRAMATE 50 MG: 25 TABLET, FILM COATED ORAL at 08:44

## 2024-10-08 RX ADMIN — PRAZOSIN HYDROCHLORIDE 4 MG: 1 CAPSULE ORAL at 20:20

## 2024-10-08 RX ADMIN — TOPIRAMATE 50 MG: 25 TABLET, FILM COATED ORAL at 20:19

## 2024-10-08 NOTE — PLAN OF CARE
Goal Outcome Evaluation:  Plan of Care Reviewed With: patient  Patient Agreement with Plan of Care: agrees     Progress: improving     Pt reports poor sleep and good appetite. Pt rates anx 6/10 and dep 8/10. Pt denies SI/HI/AVH.
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
## 2024-10-08 NOTE — PLAN OF CARE
Goal Outcome Evaluation:  Plan of Care Reviewed With: patient  Patient Agreement with Plan of Care: agrees     Progress: improving  Outcome Evaluation: Pt is calm and cooperative with staff. Pt reports good sleep and good appetite. Pt rates dep and anx 8/8. Pt denies SI/HI/AVH at this time.

## 2024-10-08 NOTE — PROGRESS NOTES
"INPATIENT PSYCHIATRIC PROGRESS NOTE    Name:  Lanny Che  :  1970  MRN:  [id number]  Visit Number:  [id number]  Length of stay:  2    SUBJECTIVE    CC/Focus of Exam: Depression, SI    INTERVAL HISTORY:  The patient reports she is feeling some better. Denies any thoughts of harm to self or others. Has ongoing depression and anxiety but it is also getting better.   Depression rating 7/10  Anxiety rating 10/10  Sleep:fair  Withdrawal sx: None  Cravin/10    Review of Systems   Respiratory: Negative.     Cardiovascular: Negative.    Gastrointestinal: Negative.    Psychiatric/Behavioral:  Positive for dysphoric mood. The patient is nervous/anxious.        OBJECTIVE    Temp:  [96.8 °F (36 °C)-97.7 °F (36.5 °C)] 97.1 °F (36.2 °C)  Heart Rate:  [84-97] 94  Resp:  [16-18] 18  BP: (107-140)/(61-86) 107/67    MENTAL STATUS EXAM:  Appearance:Casually dressed, good hygeine.   Cooperation:Cooperative  Psychomotor: No psychomotor agitation/retardation, No EPS, No motor tics  Speech-normal rate, amount.  Mood \"anxious\"   Affect-congruent, appropriate, stable  Thought Content-goal directed, no delusional material present  Thought process-linear, organized.  Suicidality: No SI  Homicidality: No HI  Perception: No AH/VH  Insight-fair   Judgement-fair    Lab Results (last 24 hours)       ** No results found for the last 24 hours. **               Imaging Results (Last 24 Hours)       ** No results found for the last 24 hours. **               ECG/EMG Results (most recent)       None             ALLERGIES: Trazodone, Latex, Citalopram, and Metoprolol    Medication Review:   Scheduled Medications:  ARIPiprazole, 5 mg, Oral, Nightly  buprenorphine-naloxone, 1.5 tablet, Sublingual, Daily  DULoxetine, 90 mg, Oral, Daily  lisinopril, 20 mg, Oral, Daily  nicotine, 1 patch, Transdermal, Q24H  NIFEdipine XL, 30 mg, Oral, Daily  pantoprazole, 40 mg, Oral, Daily  prazosin, 4 mg, Oral, Nightly  rOPINIRole, 0.5 mg, Oral, " Nightly  topiramate, 50 mg, Oral, Q12H         PRN Medications:    acetaminophen    aluminum-magnesium hydroxide-simethicone    benzonatate    benztropine **OR** benztropine    famotidine    hydrOXYzine    ibuprofen    loperamide    magnesium hydroxide    ondansetron ODT    polyethylene glycol    sodium chloride   All medications reviewed.    ASSESSMENT & PLAN:      Suicidal ideation  -SP 3       MDD (major depressive disorder), recurrent, severe, with psychosis  -Continue duloxetine  -Continue aripiprazole       PTSD (post-traumatic stress disorder)  -Continue prazosin       Opioid use disorder severe on maintenance  -Continue MAT        HTN (hypertension)  -Continue lisinopril 20 mg daily  -Continue nifedipine XL 30 mg daily       Migraine  -Topiramate       GERD  -Protonix    Special precautions: Special Precautions Level 3 (q15 min checks) .    Behavioral Health Treatment Plan and Problem List: I have reviewed and approved the Behavioral Health Treatment Plan and Problem list.  The patient has had a chance to review and agrees with the treatment plan.    Copied text in portions of this note has been reviewed and is accurate as of 10/08/24         Clinician:  Monica Pedroza MD  10/08/24  12:50 EDT

## 2024-10-09 PROCEDURE — 99232 SBSQ HOSP IP/OBS MODERATE 35: CPT | Performed by: PSYCHIATRY & NEUROLOGY

## 2024-10-09 RX ORDER — LIDOCAINE 4 G/G
1 PATCH TOPICAL
Status: DISCONTINUED | OUTPATIENT
Start: 2024-10-09 | End: 2024-10-14 | Stop reason: HOSPADM

## 2024-10-09 RX ADMIN — LISINOPRIL 20 MG: 10 TABLET ORAL at 08:04

## 2024-10-09 RX ADMIN — PANTOPRAZOLE SODIUM 40 MG: 40 TABLET, DELAYED RELEASE ORAL at 08:04

## 2024-10-09 RX ADMIN — PRAZOSIN HYDROCHLORIDE 4 MG: 1 CAPSULE ORAL at 20:43

## 2024-10-09 RX ADMIN — NICOTINE 1 PATCH: 21 PATCH TRANSDERMAL at 08:07

## 2024-10-09 RX ADMIN — LIDOCAINE 1 PATCH: 4 PATCH TOPICAL at 14:18

## 2024-10-09 RX ADMIN — HYDROXYZINE HYDROCHLORIDE 50 MG: 25 TABLET ORAL at 20:44

## 2024-10-09 RX ADMIN — DULOXETINE HYDROCHLORIDE 90 MG: 30 CAPSULE, DELAYED RELEASE ORAL at 08:04

## 2024-10-09 RX ADMIN — ROPINIROLE HYDROCHLORIDE 0.5 MG: 0.25 TABLET, FILM COATED ORAL at 20:42

## 2024-10-09 RX ADMIN — BUPRENORPHINE HYDROCHLORIDE AND NALOXONE HYDROCHLORIDE 1.5 TABLET: 8; 2 TABLET SUBLINGUAL at 08:03

## 2024-10-09 RX ADMIN — TOPIRAMATE 50 MG: 25 TABLET, FILM COATED ORAL at 20:43

## 2024-10-09 RX ADMIN — TOPIRAMATE 50 MG: 25 TABLET, FILM COATED ORAL at 08:04

## 2024-10-09 RX ADMIN — HYDROXYZINE HYDROCHLORIDE 50 MG: 25 TABLET ORAL at 14:18

## 2024-10-09 RX ADMIN — ARIPIPRAZOLE 5 MG: 10 TABLET ORAL at 20:42

## 2024-10-09 RX ADMIN — NIFEDIPINE 30 MG: 30 TABLET, FILM COATED, EXTENDED RELEASE ORAL at 08:04

## 2024-10-09 NOTE — DISCHARGE INSTR - APPOINTMENTS
[facility]  [address] , [place] [id number]  [phone]     October 17 2024 at 9:45am with Nancy.

## 2024-10-09 NOTE — PLAN OF CARE
Goal Outcome Evaluation:  Plan of Care Reviewed With: patient  Patient Agreement with Plan of Care: agrees     Updated CP.
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
## 2024-10-09 NOTE — PLAN OF CARE
Goal Outcome Evaluation:  Plan of Care Reviewed With: patient  Patient Agreement with Plan of Care: agrees     Progress: improving     Pt reports poor sleep r/t nightmares. Pt reports good appetite. Pt rates anx 10/10 and dep 10/10. Pt reports SI no plan and reports seeing shadows out of the corner of her eye.

## 2024-10-10 PROCEDURE — 99232 SBSQ HOSP IP/OBS MODERATE 35: CPT | Performed by: PSYCHIATRY & NEUROLOGY

## 2024-10-10 RX ADMIN — TOPIRAMATE 50 MG: 25 TABLET, FILM COATED ORAL at 08:38

## 2024-10-10 RX ADMIN — HYDROXYZINE HYDROCHLORIDE 50 MG: 25 TABLET ORAL at 12:24

## 2024-10-10 RX ADMIN — NIFEDIPINE 30 MG: 30 TABLET, FILM COATED, EXTENDED RELEASE ORAL at 08:39

## 2024-10-10 RX ADMIN — TOPIRAMATE 50 MG: 25 TABLET, FILM COATED ORAL at 20:29

## 2024-10-10 RX ADMIN — DULOXETINE HYDROCHLORIDE 90 MG: 30 CAPSULE, DELAYED RELEASE ORAL at 08:39

## 2024-10-10 RX ADMIN — LIDOCAINE 1 PATCH: 4 PATCH TOPICAL at 08:37

## 2024-10-10 RX ADMIN — ARIPIPRAZOLE 5 MG: 10 TABLET ORAL at 20:29

## 2024-10-10 RX ADMIN — PRAZOSIN HYDROCHLORIDE 4 MG: 1 CAPSULE ORAL at 20:29

## 2024-10-10 RX ADMIN — ROPINIROLE HYDROCHLORIDE 0.5 MG: 0.25 TABLET, FILM COATED ORAL at 20:29

## 2024-10-10 RX ADMIN — BUPRENORPHINE HYDROCHLORIDE AND NALOXONE HYDROCHLORIDE 1.5 TABLET: 8; 2 TABLET SUBLINGUAL at 08:37

## 2024-10-10 RX ADMIN — NICOTINE 1 PATCH: 21 PATCH TRANSDERMAL at 08:44

## 2024-10-10 RX ADMIN — PANTOPRAZOLE SODIUM 40 MG: 40 TABLET, DELAYED RELEASE ORAL at 08:38

## 2024-10-10 RX ADMIN — LISINOPRIL 20 MG: 10 TABLET ORAL at 08:38

## 2024-10-10 NOTE — PLAN OF CARE
Goal Outcome Evaluation:  Plan of Care Reviewed With: patient  Patient Agreement with Plan of Care: agrees     Progress: improving     Pt reports poor sleep d/t nightmares. Pt reports good appetite. Pt rates anx 10/10 and dep 10/10. Pt denies SI/HI/AVH.

## 2024-10-10 NOTE — PROGRESS NOTES
"INPATIENT PSYCHIATRIC PROGRESS NOTE    Name:  Lanny Che  :  1970  MRN:  [id number]  Visit Number:  [id number]  Length of stay:  4    SUBJECTIVE    CC/Focus of Exam: Depression, SI    INTERVAL HISTORY:  The patient reports she is feeling alright today, just a little tired. Reports ongoing depression and anxiety. She reports the patch (lidocaine) is helping with the back pain.   Depression rating 9/10  Anxiety rating 9/10  Sleep:not good  Withdrawal sx: a little bit today.   Cravin/10    Review of Systems   Respiratory: Negative.     Cardiovascular: Negative.    Gastrointestinal: Negative.        OBJECTIVE    Temp:  [96.8 °F (36 °C)-97.1 °F (36.2 °C)] 97.1 °F (36.2 °C)  Heart Rate:  [] 92  Resp:  [18] 18  BP: (138-156)/(65-90) 139/80    MENTAL STATUS EXAM:  Appearance:Casually dressed, good hygeine.   Cooperation:Cooperative  Psychomotor: No psychomotor agitation/retardation, No EPS, No motor tics  Speech-normal rate, amount.  Mood \"anxious\"   Affect-congruent, appropriate, stable  Thought Content-goal directed, no delusional material present  Thought process-linear, organized.  Suicidality: No SI  Homicidality: No HI  Perception: No AH/VH  Insight-fair   Judgement-fair    Lab Results (last 24 hours)       ** No results found for the last 24 hours. **               Imaging Results (Last 24 Hours)       ** No results found for the last 24 hours. **               ECG/EMG Results (most recent)       None             ALLERGIES: Trazodone, Latex, Citalopram, and Metoprolol    Medication Review:   Scheduled Medications:  ARIPiprazole, 5 mg, Oral, Nightly  buprenorphine-naloxone, 1.5 tablet, Sublingual, Daily  DULoxetine, 90 mg, Oral, Daily  Lidocaine, 1 patch, Transdermal, Q24H  lisinopril, 20 mg, Oral, Daily  nicotine, 1 patch, Transdermal, Q24H  NIFEdipine XL, 30 mg, Oral, Daily  pantoprazole, 40 mg, Oral, Daily  prazosin, 4 mg, Oral, Nightly  rOPINIRole, 0.5 mg, Oral, Nightly  topiramate, 50 mg, " Oral, Q12H         PRN Medications:    acetaminophen    aluminum-magnesium hydroxide-simethicone    benzonatate    benztropine **OR** benztropine    famotidine    hydrOXYzine    ibuprofen    loperamide    magnesium hydroxide    ondansetron ODT    polyethylene glycol    sodium chloride   All medications reviewed.    ASSESSMENT & PLAN:      Suicidal ideation  -SP 3       MDD (major depressive disorder), recurrent, severe, with psychosis  -Continue duloxetine  -Continue aripiprazole       PTSD (post-traumatic stress disorder)  -Continue prazosin       Opioid use disorder severe on maintenance  -Continue MAT        HTN (hypertension)  -Continue lisinopril 20 mg daily  -Continue nifedipine XL 30 mg daily      Chronic back pain  -Lidocaine patch       Migraine  -Topiramate       GERD  -Protonix    Special precautions: Special Precautions Level 3 (q15 min checks) .    Behavioral Health Treatment Plan and Problem List: I have reviewed and approved the Behavioral Health Treatment Plan and Problem list.  The patient has had a chance to review and agrees with the treatment plan.    Copied text in portions of this note has been reviewed and is accurate as of 10/10/24         Clinician:  Monica Pedroza MD  10/10/24  09:25 EDT

## 2024-10-10 NOTE — PLAN OF CARE
Problem: Adult Behavioral Health Plan of Care  Goal: Patient-Specific Goal (Individualization)  Outcome: Progressing  Flowsheets  Taken 10/7/2024 1644 by Molly Rudolph  Patient-Specific Goals (Include Timeframe): Mood stabilization, individual and group therapy 1-4 times prior to discharge, safety and discharge planning, family education.  Taken 10/7/2024 1631 by Molly Rudolph  Patient Personal Strengths:   expressive of needs   expressive of emotions   medication/treatment adherence   motivated for recovery   motivated for treatment  Patient Vulnerabilities: adverse childhood experience(s)  Taken 10/6/2024 2218 by Sydnie Beard RN  Individualized Care Needs: Plans to go to the women's shelter upon discharge  Anxieties, Fears or Concerns: None verbalized  Goal: Optimized Coping Skills in Response to Life Stressors  Outcome: Progressing  Flowsheets (Taken 10/10/2024 1507)  Optimized Coping Skills in Response to Life Stressors: making progress toward outcome  Intervention: Promote Effective Coping Strategies  Flowsheets (Taken 10/10/2024 1507)  Supportive Measures:   active listening utilized   positive reinforcement provided   counseling provided   problem-solving facilitated   self-responsibility promoted   verbalization of feelings encouraged   decision-making supported   goal-setting facilitated   self-care encouraged   self-reflection promoted  Goal: Develops/Participates in Therapeutic [place] to Support Successful Transition  Outcome: Progressing  Flowsheets (Taken 10/10/2024 1507)  Develops/Participates in Therapeutic [place] to Support Successful Transition: making progress toward outcome  Intervention: Foster Therapeutic [place]  Flowsheets (Taken 10/10/2024 1507)  Trust Relationship/Rapport:   care explained   reassurance provided   choices provided   thoughts/feelings acknowledged   emotional support provided   empathic listening provided   questions answered   questions  encouraged  Intervention: Mutually Develop Transition Plan  Flowsheets  Taken 10/10/2024 1503  Transition Support:   community resources reviewed   crisis management plan promoted   follow-up care discussed   follow-up care coordinated  Taken 10/10/2024 1506  Transportation Anticipated: health plan transportation  Transportation Concerns: no car  Current Discharge Risk: psychiatric illness  Concerns to be Addressed:   coping/stress   mental health   relationship  Readmission Within the Last 30 Days: no previous admission in last 30 days  Patient/Family Anticipated Services at Transition:   outpatient care   mental health services  Patient/Family Anticipates Transition to: shelter  Offered/Gave Vendor List: no  Data:  Therapist reviewed Dr. Pedroza's assessment, discussed patient with nursing staff and met with patient this date to further discuss patient progress, review healthy coping and safe disposition.      Clinical Maneuvering/Intervention:    Therapist assisted patient in processing session content; acknowledged and normalized patient's thoughts, feelings and concerns.  Encouraged patient to discuss/vent feelings, frustrations, and fears concerning their ongoing issues and validated patients feelings.  Discussed the importance of healthy coping and reviewed healthy coping skills such as distraction, thought reframing/redirecting, and social support.  Reviewed safe disposition with patient.    Assessment:  Patient denies suicidal ideation/homicidal ideation.  Patient reports some ongoing depression and anxiety today.  Patient states she struggles more in the evenings with how she is feeling but she is unsure why.  She discussed that the [facility] clinic is supposed to call her tomorrow about moving to [facility] and working with [facility].  She reports feeling excited and nervous.    Plan:  Patient will continue hospitalization/medication management. Patient will return to the  shelter upon stabilization. She has  aftercare with [facility].

## 2024-10-11 PROCEDURE — 99232 SBSQ HOSP IP/OBS MODERATE 35: CPT | Performed by: PSYCHIATRY & NEUROLOGY

## 2024-10-11 RX ADMIN — NIFEDIPINE 30 MG: 30 TABLET, FILM COATED, EXTENDED RELEASE ORAL at 08:24

## 2024-10-11 RX ADMIN — ROPINIROLE HYDROCHLORIDE 0.5 MG: 0.25 TABLET, FILM COATED ORAL at 20:26

## 2024-10-11 RX ADMIN — PRAZOSIN HYDROCHLORIDE 4 MG: 1 CAPSULE ORAL at 20:26

## 2024-10-11 RX ADMIN — TOPIRAMATE 50 MG: 25 TABLET, FILM COATED ORAL at 08:25

## 2024-10-11 RX ADMIN — PANTOPRAZOLE SODIUM 40 MG: 40 TABLET, DELAYED RELEASE ORAL at 08:25

## 2024-10-11 RX ADMIN — LIDOCAINE 1 PATCH: 4 PATCH TOPICAL at 08:21

## 2024-10-11 RX ADMIN — LISINOPRIL 20 MG: 10 TABLET ORAL at 08:25

## 2024-10-11 RX ADMIN — BUPRENORPHINE HYDROCHLORIDE AND NALOXONE HYDROCHLORIDE 1.5 TABLET: 8; 2 TABLET SUBLINGUAL at 08:23

## 2024-10-11 RX ADMIN — TOPIRAMATE 50 MG: 25 TABLET, FILM COATED ORAL at 20:27

## 2024-10-11 RX ADMIN — NICOTINE 1 PATCH: 21 PATCH TRANSDERMAL at 08:25

## 2024-10-11 RX ADMIN — ARIPIPRAZOLE 5 MG: 10 TABLET ORAL at 20:27

## 2024-10-11 RX ADMIN — DULOXETINE HYDROCHLORIDE 90 MG: 30 CAPSULE, DELAYED RELEASE ORAL at 08:23
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
## 2024-10-11 NOTE — PLAN OF CARE
Problem: Adult Behavioral Health Plan of Care  Goal: Patient-Specific Goal (Individualization)  Outcome: Progressing  Flowsheets  Taken 10/7/2024 1644 by Molly Rudolph  Patient-Specific Goals (Include Timeframe): Mood stabilization, individual and group therapy 1-4 times prior to discharge, safety and discharge planning, family education.  Taken 10/7/2024 1631 by Molly Rudolph  Patient Personal Strengths:   expressive of needs   expressive of emotions   medication/treatment adherence   motivated for recovery   motivated for treatment  Patient Vulnerabilities: adverse childhood experience(s)  Taken 10/6/2024 2218 by Sydnie Beard RN  Individualized Care Needs: Plans to go to the women's shelter upon discharge  Anxieties, Fears or Concerns: None verbalized  Goal: Optimized Coping Skills in Response to Life Stressors  Outcome: Progressing  Flowsheets (Taken 10/11/2024 1418)  Optimized Coping Skills in Response to Life Stressors: making progress toward outcome  Intervention: Promote Effective Coping Strategies  Flowsheets (Taken 10/11/2024 1418)  Supportive Measures:   active listening utilized   positive reinforcement provided   self-responsibility promoted   counseling provided   problem-solving facilitated   verbalization of feelings encouraged   decision-making supported   goal-setting facilitated   self-care encouraged   self-reflection promoted  Goal: Develops/Participates in Therapeutic [place] to Support Successful Transition  Outcome: Progressing  Flowsheets (Taken 10/11/2024 1418)  Develops/Participates in Therapeutic [place] to Support Successful Transition: making progress toward outcome  Intervention: Foster Therapeutic [place]  Flowsheets (Taken 10/11/2024 1418)  Trust Relationship/Rapport:   care explained   reassurance provided   choices provided   thoughts/feelings acknowledged   emotional support provided   empathic listening provided   questions answered   questions  encouraged  Intervention: Mutually Develop Transition Plan  Flowsheets  Taken 10/11/2024 4852  Transition Support:   community resources reviewed   crisis management plan promoted   follow-up care discussed  Taken 10/11/2024 1417  Discharge Coordination/Progress: Patient has well care insurance, and consents to referral to [facility]  Transportation Anticipated:   health plan transportation   agency  Transportation Concerns: no car  Current Discharge Risk:   psychiatric illness   substance use/abuse  Concerns to be Addressed:   coping/stress   mental health   relationship  Readmission Within the Last 30 Days: no previous admission in last 30 days  Patient/Family Anticipated Services at Transition: rehabilitation services  Patient's Choice of Community Agency(s): [facility]  Patient/Family Anticipates Transition to: inpatient rehabilitation facility  Offered/Gave Vendor List: no   Data:  Therapist reviewed Dr. Pedroza's assessment, discussed patient with nursing staff and met with patient this date to further discuss patient progress, review healthy coping and safe disposition.      Clinical Maneuvering/Intervention:    Therapist assisted patient in processing session content; acknowledged and normalized patient's thoughts, feelings and concerns.  Encouraged patient to discuss/vent feelings, frustrations, and fears concerning their ongoing issues and validated patients feelings.  Discussed the importance of healthy coping and reviewed healthy coping skills such as distraction, thought reframing/redirecting and social support  Reviewed safe disposition with patient.    Assessment:  Patient denies suicidal ideation/homicidal ideation.  Patient reports decrease in depression and anxiety today.  Patient states she wants to attend [facility] and asked that this therapist send a referral. Later transferred [facility] to her unit for patient to complete the phone screening.    Plan:  Patient will  continue hospitalization/medication management. Patient plans to attend [facility] and is completing a phone screening.

## 2024-10-11 NOTE — PROGRESS NOTES
"INPATIENT PSYCHIATRIC PROGRESS NOTE    Name:  Lanny Che  :  1970  MRN:  [id number]  Visit Number:  [id number]  Length of stay:  5    SUBJECTIVE    CC/Focus of Exam: Depression, SI    INTERVAL HISTORY:  The patient reports she is feeling more anxious today, though depression is some better. Denies active SI today. Reports compliance with medications and no side effects.  Depression rating 8/10  Anxiety rating 10/10  Sleep:not good  Withdrawal sx: a little bit today.   Cravin/10    Review of Systems   Respiratory: Negative.     Cardiovascular: Negative.    Gastrointestinal: Negative.        OBJECTIVE    Temp:  [97.3 °F (36.3 °C)-97.7 °F (36.5 °C)] 97.7 °F (36.5 °C)  Heart Rate:  [71-95] 71  Resp:  [18] 18  BP: (104-147)/(60-81) 115/67    MENTAL STATUS EXAM:  Appearance:Casually dressed, good hygeine.   Cooperation:Cooperative  Psychomotor: No psychomotor agitation/retardation, No EPS, No motor tics  Speech-normal rate, amount.  Mood \"anxious\"   Affect-congruent, appropriate, stable  Thought Content-goal directed, no delusional material present  Thought process-linear, organized.  Suicidality: No SI  Homicidality: No HI  Perception: No AH/VH  Insight-fair   Judgement-fair    Lab Results (last 24 hours)       ** No results found for the last 24 hours. **               Imaging Results (Last 24 Hours)       ** No results found for the last 24 hours. **               ECG/EMG Results (most recent)       None             ALLERGIES: Trazodone, Latex, Citalopram, and Metoprolol    Medication Review:   Scheduled Medications:  ARIPiprazole, 5 mg, Oral, Nightly  buprenorphine-naloxone, 1.5 tablet, Sublingual, Daily  DULoxetine, 90 mg, Oral, Daily  Lidocaine, 1 patch, Transdermal, Q24H  lisinopril, 20 mg, Oral, Daily  nicotine, 1 patch, Transdermal, Q24H  NIFEdipine XL, 30 mg, Oral, Daily  pantoprazole, 40 mg, Oral, Daily  prazosin, 4 mg, Oral, Nightly  rOPINIRole, 0.5 mg, Oral, Nightly  topiramate, 50 mg, Oral, " Q12H         PRN Medications:    acetaminophen    aluminum-magnesium hydroxide-simethicone    benzonatate    benztropine **OR** benztropine    famotidine    hydrOXYzine    ibuprofen    loperamide    magnesium hydroxide    ondansetron ODT    polyethylene glycol    sodium chloride   All medications reviewed.    ASSESSMENT & PLAN:      Suicidal ideation  -SP 3       MDD (major depressive disorder), recurrent, severe, with psychosis  -Continue duloxetine  -Continue aripiprazole       PTSD (post-traumatic stress disorder)  -Continue prazosin       Opioid use disorder severe on maintenance  -Continue MAT        HTN (hypertension)  -Continue lisinopril 20 mg daily  -Continue nifedipine XL 30 mg daily      Chronic back pain  -Lidocaine patch       Migraine  -Topiramate       GERD  -Protonix    Special precautions: Special Precautions Level 3 (q15 min checks) .    Behavioral Health Treatment Plan and Problem List: I have reviewed and approved the Behavioral Health Treatment Plan and Problem list.  The patient has had a chance to review and agrees with the treatment plan.    Copied text in portions of this note has been reviewed and is accurate as of 10/11/24         Clinician:  Monica Pedroza MD  10/11/24  12:00 EDT

## 2024-10-11 NOTE — PLAN OF CARE
Goal Outcome Evaluation:  Plan of Care Reviewed With: patient  Patient Agreement with Plan of Care: agrees     Progress: improving  Outcome Evaluation: Pt reports good sleep and appetite. Rates anxiety and depression a 10/10. Patient denies SI/HI/AVH. Patient calm and cooperative this shift.

## 2024-10-11 NOTE — PLAN OF CARE
Goal Outcome Evaluation:  Plan of Care Reviewed With: patient  Patient Agreement with Plan of Care: agrees     Progress: improving  Outcome Evaluation: Patient reports poor sleep and a good appetite. Rates A/D 10/8. Denies SI/HI or halluciantions. Reports feeling helpless,hopeless and worthless. Pt has slept about 7 hour this shift.

## 2024-10-12 PROCEDURE — 99232 SBSQ HOSP IP/OBS MODERATE 35: CPT | Performed by: PSYCHIATRY & NEUROLOGY

## 2024-10-12 RX ADMIN — NICOTINE 1 PATCH: 21 PATCH TRANSDERMAL at 08:22

## 2024-10-12 RX ADMIN — NIFEDIPINE 30 MG: 30 TABLET, FILM COATED, EXTENDED RELEASE ORAL at 08:22

## 2024-10-12 RX ADMIN — TOPIRAMATE 50 MG: 25 TABLET, FILM COATED ORAL at 21:12

## 2024-10-12 RX ADMIN — ROPINIROLE HYDROCHLORIDE 0.5 MG: 0.25 TABLET, FILM COATED ORAL at 21:12

## 2024-10-12 RX ADMIN — BENZONATATE 100 MG: 100 CAPSULE ORAL at 21:12

## 2024-10-12 RX ADMIN — ACETAMINOPHEN 650 MG: 325 TABLET ORAL at 21:12

## 2024-10-12 RX ADMIN — TOPIRAMATE 50 MG: 25 TABLET, FILM COATED ORAL at 08:22

## 2024-10-12 RX ADMIN — PRAZOSIN HYDROCHLORIDE 4 MG: 1 CAPSULE ORAL at 21:12

## 2024-10-12 RX ADMIN — LIDOCAINE 1 PATCH: 4 PATCH TOPICAL at 08:21

## 2024-10-12 RX ADMIN — PANTOPRAZOLE SODIUM 40 MG: 40 TABLET, DELAYED RELEASE ORAL at 08:22

## 2024-10-12 RX ADMIN — LISINOPRIL 20 MG: 10 TABLET ORAL at 08:22

## 2024-10-12 RX ADMIN — BUPRENORPHINE HYDROCHLORIDE AND NALOXONE HYDROCHLORIDE 1.5 TABLET: 8; 2 TABLET SUBLINGUAL at 08:22

## 2024-10-12 RX ADMIN — HYDROXYZINE HYDROCHLORIDE 50 MG: 25 TABLET ORAL at 13:21

## 2024-10-12 RX ADMIN — DULOXETINE HYDROCHLORIDE 90 MG: 30 CAPSULE, DELAYED RELEASE ORAL at 08:22

## 2024-10-12 RX ADMIN — ARIPIPRAZOLE 5 MG: 10 TABLET ORAL at 21:16

## 2024-10-12 NOTE — PLAN OF CARE
Goal Outcome Evaluation:  Plan of Care Reviewed With: patient  Plan of Care Reviewed With: patient  Patient Agreement with Plan of Care: agrees     Progress: improving        Patient rates anxiety 10 and depression 10, denies thoughts of harming self and others, and denies hallucinations.

## 2024-10-12 NOTE — PROGRESS NOTES
"      Inpatient Psych Progress Note     Clinician: Greyson Madera MD  Admission Date: 10/6/2024  07:20 EDT 10/12/24    Behavioral Health Treatment Plan and Problem List: I have reviewed and approved the Behavioral Health Treatment Plan and Problem list.    Allergies  Allergies   Allergen Reactions    Trazodone Seizure    Latex Hives    Citalopram Palpitations     s    Metoprolol Palpitations       Hospital Day: 6 days      Assessment completed within view of staff    History  CC/clinical focus: depression, SI    Interval HPI: Patient seen and evaluated by me.  Chart reviewed.  Patient reports feeling some worse today in regards to depression. States that she realizes that she does have a codependency problem that she needs to work on. Denies SI/HI at this time. No AVH. Tolerating medications ok.    Med Compliant.  ROS otherwise as below.    Review of Systems   Constitutional: Negative.    HENT: Negative.     Eyes: Negative.    Respiratory: Negative.     Cardiovascular: Negative.    Gastrointestinal: Negative.    Endocrine: Negative.    Genitourinary: Negative.    Musculoskeletal: Negative.    Skin: Negative.    Allergic/Immunologic: Negative.    Neurological: Negative.    Hematological: Negative.    Psychiatric/Behavioral:  Positive for dysphoric mood.         /57 (BP Location: Right arm, Patient Position: Sitting)   Pulse 77   Temp 96 °F (35.6 °C) (Esophageal)   Resp 18   Ht 154.9 cm (61\")   Wt 103 kg (227 lb 9.6 oz)   LMP 09/23/2024   SpO2 96%   BMI 43.00 kg/m²     Mental Status Exam  Mood: depressed  Affect: mood-congruent   Thought Processes:  linear  Thought Content: normal  Hallucinations: no  Suicidal Thoughts: denies  Suicidal Plan/Intent: denies  Hopelesness:Moderate  Homicidal Thoughts:  denies      Medical Decision Making:   Labs:     Lab Results (last 24 hours)       ** No results found for the last 24 hours. **              Radiology:     Imaging Results (Last 24 Hours)       ** No results " found for the last 24 hours. **              EKG:     ECG/EMG Results (most recent)       None             Medications:  ARIPiprazole, 5 mg, Oral, Nightly  buprenorphine-naloxone, 1.5 tablet, Sublingual, Daily  DULoxetine, 90 mg, Oral, Daily  Lidocaine, 1 patch, Transdermal, Q24H  lisinopril, 20 mg, Oral, Daily  nicotine, 1 patch, Transdermal, Q24H  NIFEdipine XL, 30 mg, Oral, Daily  pantoprazole, 40 mg, Oral, Daily  prazosin, 4 mg, Oral, Nightly  rOPINIRole, 0.5 mg, Oral, Nightly  topiramate, 50 mg, Oral, Q12H           All medications reviewed.      Assessment and Plan:      Suicidal ideation  - SP 3     MDD (major depressive disorder), recurrent, severe, with psychosis  - Continue duloxetine  - Continue aripiprazole     PTSD (post-traumatic stress disorder)  - Continue prazosin     Opioid use disorder severe on maintenance  - Continue MAT     HTN (hypertension)  - Continue lisinopril 20 mg daily  - Continue nifedipine XL 30 mg daily     Chronic back pain  - Lidocaine patch     Migraine  - Topiramate     GERD  - Protonix      Continue hospitalization for safety and stabilization.  Continue current level of Special Precautions (q15 minute checks).        This note was generated by a scribe, Jun Child. The work documented in this note was completed, reviewed, and approved by the attending psychiatrist as designated Dr. Greyson Madera electronic signature.     IGreyson MD, personally performed the services described in this documentation as scribed by the above named individual and is both accurate and complete as of 10/12/2024.

## 2024-10-12 NOTE — PLAN OF CARE
Goal Outcome Evaluation:  Plan of Care Reviewed With: patient  Plan of Care Reviewed With: patient  Patient Agreement with Plan of Care: agrees     Progress: improving  Outcome Evaluation: Pt reports sleeping and eating well. Rates A/D 0/10. Denies HI or hallucinations .Reports SI with no plan or intent. Reports feeling helpless,hopeless and worthless. Pt has slept about 4.5 hours this shift.

## 2024-10-13 PROCEDURE — 99232 SBSQ HOSP IP/OBS MODERATE 35: CPT | Performed by: PSYCHIATRY & NEUROLOGY

## 2024-10-13 RX ADMIN — NIFEDIPINE 30 MG: 30 TABLET, FILM COATED, EXTENDED RELEASE ORAL at 08:25

## 2024-10-13 RX ADMIN — ACETAMINOPHEN 650 MG: 325 TABLET ORAL at 20:35

## 2024-10-13 RX ADMIN — TOPIRAMATE 50 MG: 25 TABLET, FILM COATED ORAL at 20:35

## 2024-10-13 RX ADMIN — ARIPIPRAZOLE 5 MG: 10 TABLET ORAL at 20:35

## 2024-10-13 RX ADMIN — TOPIRAMATE 50 MG: 25 TABLET, FILM COATED ORAL at 08:24

## 2024-10-13 RX ADMIN — PRAZOSIN HYDROCHLORIDE 4 MG: 1 CAPSULE ORAL at 20:35

## 2024-10-13 RX ADMIN — BENZONATATE 100 MG: 100 CAPSULE ORAL at 08:25

## 2024-10-13 RX ADMIN — DULOXETINE HYDROCHLORIDE 90 MG: 30 CAPSULE, DELAYED RELEASE ORAL at 08:24

## 2024-10-13 RX ADMIN — LIDOCAINE 1 PATCH: 4 PATCH TOPICAL at 08:25

## 2024-10-13 RX ADMIN — LISINOPRIL 20 MG: 10 TABLET ORAL at 08:24

## 2024-10-13 RX ADMIN — HYDROXYZINE HYDROCHLORIDE 50 MG: 25 TABLET ORAL at 16:24

## 2024-10-13 RX ADMIN — ROPINIROLE HYDROCHLORIDE 0.5 MG: 0.25 TABLET, FILM COATED ORAL at 20:35

## 2024-10-13 RX ADMIN — PANTOPRAZOLE SODIUM 40 MG: 40 TABLET, DELAYED RELEASE ORAL at 08:25

## 2024-10-13 RX ADMIN — BUPRENORPHINE HYDROCHLORIDE AND NALOXONE HYDROCHLORIDE 1.5 TABLET: 8; 2 TABLET SUBLINGUAL at 08:24

## 2024-10-13 NOTE — PLAN OF CARE
Goal Outcome Evaluation:  Plan of Care Reviewed With: patient  Plan of Care Reviewed With: patient  Patient Agreement with Plan of Care: agrees     Progress: improving  Outcome Evaluation: Pt rates anx 10/10 and dep 10/10.  Per pt sleep is poor.  Appetite is good.  Pt oriented x 4.  Pt complaining of not feeling well this shift. Per pt she is experiencing feet pain this shift.

## 2024-10-13 NOTE — PLAN OF CARE
Problem: Adult Behavioral Health Plan of Care  Goal: Plan of Care Review  Recent Flowsheet Documentation  Taken 10/13/2024 1647 by Ruthie Cuevas, RN  Progress: improving  Patient Agreement with Plan of Care: agrees  Outcome Evaluation: client asked dr. ruiz to go home today. Client states she would like to be discharged tomorrow and leave with her kids.  Plan of Care Reviewed With: patient  Taken 10/13/2024 0954 by Ruthie Cuevas, RN  Patient Agreement with Plan of Care: agrees   Goal Outcome Evaluation:  Plan of Care Reviewed With: patient  Plan of Care Reviewed With: patient  Patient Agreement with Plan of Care: agrees     Progress: improving  Outcome Evaluation: client asked dr. ruiz to go home today. Client states she would like to be discharged tomorrow and leave with her kids.

## 2024-10-13 NOTE — PROGRESS NOTES
"      Inpatient Psych Progress Note     Clinician: Greyson Madera MD  Admission Date: 10/6/2024  07:16 EDT 10/13/24    Behavioral Health Treatment Plan and Problem List: I have reviewed and approved the Behavioral Health Treatment Plan and Problem list.    Allergies  Allergies   Allergen Reactions    Trazodone Seizure    Latex Hives    Citalopram Palpitations     s    Metoprolol Palpitations       Hospital Day: 7 days      Assessment completed within view of staff    History  CC/clinical focus: depression, SI    Interval HPI: Patient seen and evaluated by me.  Chart reviewed.  Feeling some better today. She has been able to communicate with her kids, which she thinks has been helpful. She admits to ongoing high levels of anxiety and depression, rating both 10/10 at this time. Denies SI/HI/AVH. Tolerating medications ok. No reported side effects.   Med Compliant.  ROS otherwise as below.    Review of Systems   Constitutional: Negative.    HENT: Negative.     Eyes: Negative.    Respiratory: Negative.     Cardiovascular: Negative.    Gastrointestinal: Negative.    Endocrine: Negative.    Genitourinary: Negative.    Musculoskeletal: Negative.  Positive for arthralgias.   Skin: Negative.    Allergic/Immunologic: Negative.    Neurological: Negative.    Hematological: Negative.    Psychiatric/Behavioral:  Positive for dysphoric mood.         /70 (BP Location: Right arm, Patient Position: Sitting)   Pulse 96   Temp 97.4 °F (36.3 °C) (Temporal)   Resp 18   Ht 154.9 cm (61\")   Wt 103 kg (227 lb 9.6 oz)   LMP 09/23/2024   SpO2 96%   BMI 43.00 kg/m²     Mental Status Exam  Mood: depressed  Affect: mood-congruent   Thought Processes:  linear  Thought Content: normal  Hallucinations: no  Suicidal Thoughts: denies  Suicidal Plan/Intent: denies  Hopelesness:Moderate  Homicidal Thoughts:  denies      Medical Decision Making:   Labs:     Lab Results (last 24 hours)       ** No results found for the last 24 hours. ** "              Radiology:     Imaging Results (Last 24 Hours)       ** No results found for the last 24 hours. **              EKG:     ECG/EMG Results (most recent)       None             Medications:  ARIPiprazole, 5 mg, Oral, Nightly  buprenorphine-naloxone, 1.5 tablet, Sublingual, Daily  DULoxetine, 90 mg, Oral, Daily  Lidocaine, 1 patch, Transdermal, Q24H  lisinopril, 20 mg, Oral, Daily  nicotine, 1 patch, Transdermal, Q24H  NIFEdipine XL, 30 mg, Oral, Daily  pantoprazole, 40 mg, Oral, Daily  prazosin, 4 mg, Oral, Nightly  rOPINIRole, 0.5 mg, Oral, Nightly  topiramate, 50 mg, Oral, Q12H           All medications reviewed.      Assessment and Plan:      Suicidal ideation  - SP 3     MDD (major depressive disorder), recurrent, severe, with psychosis  - Continue duloxetine  - Continue aripiprazole     PTSD (post-traumatic stress disorder)  - Continue prazosin     Opioid use disorder severe on maintenance  - Continue MAT     HTN (hypertension)  - Continue lisinopril 20 mg daily  - Continue nifedipine XL 30 mg daily     Chronic back pain  - Lidocaine patch     Migraine  - Topiramate     GERD  - Protonix         Continue hospitalization for safety and stabilization.  Continue current level of Special Precautions (q15 minute checks).        This note was generated by a scribe, Jun Child. The work documented in this note was completed, reviewed, and approved by the attending psychiatrist as designated Dr. Greyson Madera electronic signature.     I, Greyson Madera MD, personally performed the services described in this documentation as scribed by the above named individual and is both accurate and complete as of 10/13/2024.

## 2024-10-14 VITALS
WEIGHT: 227.6 LBS | HEART RATE: 66 BPM | BODY MASS INDEX: 42.97 KG/M2 | SYSTOLIC BLOOD PRESSURE: 115 MMHG | TEMPERATURE: 96.9 F | RESPIRATION RATE: 16 BRPM | HEIGHT: 61 IN | OXYGEN SATURATION: 98 % | DIASTOLIC BLOOD PRESSURE: 65 MMHG

## 2024-10-14 RX ADMIN — LIDOCAINE 1 PATCH: 4 PATCH TOPICAL at 08:28

## 2024-10-14 RX ADMIN — LISINOPRIL 20 MG: 10 TABLET ORAL at 08:28

## 2024-10-14 RX ADMIN — BUPRENORPHINE HYDROCHLORIDE AND NALOXONE HYDROCHLORIDE 1.5 TABLET: 8; 2 TABLET SUBLINGUAL at 08:29

## 2024-10-14 RX ADMIN — TOPIRAMATE 50 MG: 25 TABLET, FILM COATED ORAL at 08:29

## 2024-10-14 RX ADMIN — PANTOPRAZOLE SODIUM 40 MG: 40 TABLET, DELAYED RELEASE ORAL at 08:29

## 2024-10-14 RX ADMIN — DULOXETINE HYDROCHLORIDE 90 MG: 30 CAPSULE, DELAYED RELEASE ORAL at 08:29

## 2024-10-14 RX ADMIN — NIFEDIPINE 30 MG: 30 TABLET, FILM COATED, EXTENDED RELEASE ORAL at 08:29

## 2024-10-14 RX ADMIN — NICOTINE 1 PATCH: 21 PATCH TRANSDERMAL at 08:29

## 2024-10-14 NOTE — DISCHARGE SUMMARY
":  1970  MRN:  [id number]  Visit Number:  [id number]      Date of Admission:10/6/2024   Date of Discharge:  10/14/2024    Discharge Diagnosis:  Principal Problem:    Suicidal ideation  Active Problems:    MDD (major depressive disorder), recurrent, severe, with psychosis    HTN (hypertension)    PTSD (post-traumatic stress disorder)    Opioid use disorder severe on maintenance therapy      Admission Diagnosis:  Suicidal ideation [R45.851]     HPI  Lanny Che is a 54 y.o. female who was admitted on 10/6/2024 with complaints of worsening depression and suicidal ideation with a plan to hang herself using a scarf.   For details please see H&P dated 10/70/24.    Hospital Course  Patient is a 54 y.o. female presented with depression and suicidal ideations. The patient was admitted to the [facility] inpatient unit for safety, further evaluation and treatment.  She was continued on her home medications including Suboxone for MAT.  The patient was also able to take part in individual and group counseling sessions and work on appropriate coping skills.  The patient made steady improvement in her mood and expressed feeling more positive and hopeful about future. Sleep and appetite were improved.  The day of discharge the patient was calm, cooperative and pleasant. Mood was reported to be good, and denied SI/HI/AVH. Also reported no medication side effects.        Mental Status Exam upon discharge:   Mood \"good\"   Affect-congruent, appropriate, stable  Thought Content-goal directed, no delusional material present  Thought process-linear, organized.  Suicidality: No SI  Homicidality: No HI  Perception: No AH/VH    Procedures Performed         Consults:   Consults       No orders found from 2024 to 10/7/2024.            Pertinent Test Results:   Admission on 10/06/2024   Component Date Value Ref Range Status    Hepatitis B Surface Ag 10/06/2024 Non-Reactive  Non-Reactive Final    Hep A IgM 10/06/2024 " Non-Reactive  Non-Reactive Final    Hep B C IgM 10/06/2024 Reactive (A)  Non-Reactive Final    Hepatitis C Ab 10/06/2024 Non-Reactive  Non-Reactive Final   Admission on 10/06/2024, Discharged on 10/06/2024   Component Date Value Ref Range Status    HCG Qualitative 10/06/2024 Negative  Negative Final    Glucose 10/06/2024 101 (H)  65 - 99 mg/dL Final    BUN 10/06/2024 7  6 - 20 mg/dL Final    Creatinine 10/06/2024 0.78  0.57 - 1.00 mg/dL Final    Sodium 10/06/2024 143  136 - 145 mmol/L Final    Potassium 10/06/2024 4.1  3.5 - 5.2 mmol/L Final    Slight hemolysis detected by analyzer. Result may be falsely elevated.    Chloride 10/06/2024 111 (H)  98 - 107 mmol/L Final    CO2 10/06/2024 23.3  22.0 - 29.0 mmol/L Final    Calcium 10/06/2024 8.9  8.6 - 10.5 mg/dL Final    Total Protein 10/06/2024 7.7  6.0 - 8.5 g/dL Final    Albumin 10/06/2024 3.9  3.5 - 5.2 g/dL Final    ALT (SGPT) 10/06/2024 36 (H)  1 - 33 U/L Final    AST (SGOT) 10/06/2024 24  1 - 32 U/L Final    Alkaline Phosphatase 10/06/2024 119 (H)  39 - 117 U/L Final    Total Bilirubin 10/06/2024 <0.2  0.0 - 1.2 mg/dL Final    Globulin 10/06/2024 3.8  gm/dL Final    A/G Ratio 10/06/2024 1.0  g/dL Final    BUN/Creatinine Ratio 10/06/2024 9.0  7.0 - 25.0 Final    Anion Gap 10/06/2024 8.7  5.0 - 15.0 mmol/L Final    eGFR 10/06/2024 91.5  >60.0 mL/min/1.73 Final    Color, UA 10/06/2024 Yellow  Yellow, Straw Final    Appearance, UA 10/06/2024 Clear  Clear Final    pH, UA 10/06/2024 8.0  5.0 - 8.0 Final    Specific Gravity, UA 10/06/2024 1.009  1.005 - 1.030 Final    Glucose, UA 10/06/2024 Negative  Negative Final    Ketones, UA 10/06/2024 Negative  Negative Final    Bilirubin, UA 10/06/2024 Negative  Negative Final    Blood, UA 10/06/2024 Negative  Negative Final    Protein, UA 10/06/2024 Negative  Negative Final    Leuk Esterase, UA 10/06/2024 Negative  Negative Final    Nitrite, UA 10/06/2024 Negative  Negative Final    Urobilinogen, UA 10/06/2024 0.2 E.U./dL  0.2 -  1.0 E.U./dL Final    THC, Screen, Urine 10/06/2024 Negative  Negative Final    Phencyclidine (PCP), Urine 10/06/2024 Negative  Negative Final    Cocaine Screen, Urine 10/06/2024 Negative  Negative Final    Methamphetamine, Ur 10/06/2024 Negative  Negative Final    Opiate Screen 10/06/2024 Negative  Negative Final    Amphetamine Screen, Urine 10/06/2024 Negative  Negative Final    Benzodiazepine Screen, Urine 10/06/2024 Negative  Negative Final    Tricyclic Antidepressants Screen 10/06/2024 Negative  Negative Final    Methadone Screen, Urine 10/06/2024 Negative  Negative Final    Barbiturates Screen, Urine 10/06/2024 Negative  Negative Final    Oxycodone Screen, Urine 10/06/2024 Negative  Negative Final    Buprenorphine, Screen, Urine 10/06/2024 Positive (A)  Negative Final    Magnesium 10/06/2024 2.1  1.6 - 2.6 mg/dL Final    Ethanol 10/06/2024 <10  0 - 10 mg/dL Final    Ethanol % 10/06/2024 <0.010  % Final    WBC 10/06/2024 7.77  3.40 - 10.80 10*3/mm3 Final    RBC 10/06/2024 4.13  3.77 - 5.28 10*6/mm3 Final    Hemoglobin 10/06/2024 12.0  12.0 - 15.9 g/dL Final    Hematocrit 10/06/2024 37.2  34.0 - 46.6 % Final    MCV 10/06/2024 90.1  79.0 - 97.0 fL Final    MCH 10/06/2024 29.1  26.6 - 33.0 pg Final    MCHC 10/06/2024 32.3  31.5 - 35.7 g/dL Final    RDW 10/06/2024 14.6  12.3 - 15.4 % Final    RDW-SD 10/06/2024 48.0  37.0 - 54.0 fl Final    MPV 10/06/2024 9.7  6.0 - 12.0 fL Final    Platelets 10/06/2024 247  140 - 450 10*3/mm3 Final    Neutrophil % 10/06/2024 71.3  42.7 - 76.0 % Final    Lymphocyte % 10/06/2024 17.4 (L)  19.6 - 45.3 % Final    Monocyte % 10/06/2024 5.5  5.0 - 12.0 % Final    Eosinophil % 10/06/2024 4.6  0.3 - 6.2 % Final    Basophil % 10/06/2024 0.4  0.0 - 1.5 % Final    Immature Grans % 10/06/2024 0.8 (H)  0.0 - 0.5 % Final    Neutrophils, Absolute 10/06/2024 5.54  1.70 - 7.00 10*3/mm3 Final    Lymphocytes, Absolute 10/06/2024 1.35  0.70 - 3.10 10*3/mm3 Final    Monocytes, Absolute 10/06/2024 0.43   0.10 - 0.90 10*3/mm3 Final    Eosinophils, Absolute 10/06/2024 0.36  0.00 - 0.40 10*3/mm3 Final    Basophils, Absolute 10/06/2024 0.03  0.00 - 0.20 10*3/mm3 Final    Immature Grans, Absolute 10/06/2024 0.06 (H)  0.00 - 0.05 10*3/mm3 Final    nRBC 10/06/2024 0.0  0.0 - 0.2 /100 WBC Final    Fentanyl, Urine 10/06/2024 Negative  Negative Final    QT Interval 10/06/2024 346  ms Final    QTC Interval 10/06/2024 434  ms Final   Admission on 10/02/2024, Discharged on 10/02/2024   Component Date Value Ref Range Status    Glucose 10/02/2024 123 (H)  65 - 99 mg/dL Final    BUN 10/02/2024 7  6 - 20 mg/dL Final    Creatinine 10/02/2024 0.75  0.57 - 1.00 mg/dL Final    Sodium 10/02/2024 137  136 - 145 mmol/L Final    Potassium 10/02/2024 3.6  3.5 - 5.2 mmol/L Final    Slight hemolysis detected by analyzer. Result may be falsely elevated.    Chloride 10/02/2024 106  98 - 107 mmol/L Final    CO2 10/02/2024 20.0 (L)  22.0 - 29.0 mmol/L Final    Calcium 10/02/2024 8.8  8.6 - 10.5 mg/dL Final    Total Protein 10/02/2024 7.3  6.0 - 8.5 g/dL Final    Albumin 10/02/2024 3.7  3.5 - 5.2 g/dL Final    ALT (SGPT) 10/02/2024 40 (H)  1 - 33 U/L Final    AST (SGOT) 10/02/2024 28  1 - 32 U/L Final    Alkaline Phosphatase 10/02/2024 113  39 - 117 U/L Final    Total Bilirubin 10/02/2024 0.3  0.0 - 1.2 mg/dL Final    Globulin 10/02/2024 3.6  gm/dL Final    A/G Ratio 10/02/2024 1.0  g/dL Final    BUN/Creatinine Ratio 10/02/2024 9.3  7.0 - 25.0 Final    Anion Gap 10/02/2024 11.0  5.0 - 15.0 mmol/L Final    eGFR 10/02/2024 95.9  >60.0 mL/min/1.73 Final    HCG Qualitative 10/02/2024 Negative  Negative Final    Color, UA 10/02/2024 Yellow  Yellow, Straw Final    Appearance, UA 10/02/2024 Clear  Clear Final    pH, UA 10/02/2024 5.5  5.0 - 8.0 Final    Specific [place], UA 10/02/2024 1.012  1.005 - 1.030 Final    Glucose, UA 10/02/2024 Negative  Negative Final    Ketones, UA 10/02/2024 Negative  Negative Final    Bilirubin, UA 10/02/2024 Negative   Negative Final    Blood, UA 10/02/2024 Negative  Negative Final    Protein, UA 10/02/2024 Negative  Negative Final    Leuk Esterase, UA 10/02/2024 Negative  Negative Final    Nitrite, UA 10/02/2024 Negative  Negative Final    Urobilinogen, UA 10/02/2024 1.0 E.U./dL  0.2 - 1.0 E.U./dL Final    C-Reactive Protein 10/02/2024 0.98 (H)  0.00 - 0.50 mg/dL Final    Lactate 10/02/2024 1.2  0.5 - 2.0 mmol/L Final    Blood Culture 10/02/2024 No growth at 5 days   Final    Blood Culture 10/02/2024 No growth at 5 days   Final    THC, Screen, Urine 10/02/2024 Negative  Negative Final    Phencyclidine (PCP), Urine 10/02/2024 Negative  Negative Final    Cocaine Screen, Urine 10/02/2024 Negative  Negative Final    Methamphetamine, Ur 10/02/2024 Negative  Negative Final    Opiate Screen 10/02/2024 Negative  Negative Final    Amphetamine Screen, Urine 10/02/2024 Negative  Negative Final    Benzodiazepine Screen, Urine 10/02/2024 Negative  Negative Final    Tricyclic Antidepressants Screen 10/02/2024 Negative  Negative Final    Methadone Screen, Urine 10/02/2024 Negative  Negative Final    Barbiturates Screen, Urine 10/02/2024 Negative  Negative Final    Oxycodone Screen, Urine 10/02/2024 Negative  Negative Final    Buprenorphine, Screen, Urine 10/02/2024 Positive (A)  Negative Final    WBC 10/02/2024 10.39  3.40 - 10.80 10*3/mm3 Final    RBC 10/02/2024 4.17  3.77 - 5.28 10*6/mm3 Final    Hemoglobin 10/02/2024 12.2  12.0 - 15.9 g/dL Final    Hematocrit 10/02/2024 38.6  34.0 - 46.6 % Final    MCV 10/02/2024 92.6  79.0 - 97.0 fL Final    MCH 10/02/2024 29.3  26.6 - 33.0 pg Final    MCHC 10/02/2024 31.6  31.5 - 35.7 g/dL Final    RDW 10/02/2024 14.8  12.3 - 15.4 % Final    RDW-SD 10/02/2024 50.6  37.0 - 54.0 fl Final    MPV 10/02/2024 10.2  6.0 - 12.0 fL Final    Platelets 10/02/2024 216  140 - 450 10*3/mm3 Final    Neutrophil % 10/02/2024 74.3  42.7 - 76.0 % Final    Lymphocyte % 10/02/2024 14.1 (L)  19.6 - 45.3 % Final    Monocyte %  10/02/2024 6.4  5.0 - 12.0 % Final    Eosinophil % 10/02/2024 4.5  0.3 - 6.2 % Final    Basophil % 10/02/2024 0.4  0.0 - 1.5 % Final    Immature Grans % 10/02/2024 0.3  0.0 - 0.5 % Final    Neutrophils, Absolute 10/02/2024 7.73 (H)  1.70 - 7.00 10*3/mm3 Final    Lymphocytes, Absolute 10/02/2024 1.46  0.70 - 3.10 10*3/mm3 Final    Monocytes, Absolute 10/02/2024 0.66  0.10 - 0.90 10*3/mm3 Final    Eosinophils, Absolute 10/02/2024 0.47 (H)  0.00 - 0.40 10*3/mm3 Final    Basophils, Absolute 10/02/2024 0.04  0.00 - 0.20 10*3/mm3 Final    Immature Grans, Absolute 10/02/2024 0.03  0.00 - 0.05 10*3/mm3 Final    nRBC 10/02/2024 0.0  0.0 - 0.2 /100 WBC Final    Extra Tube 10/02/2024 Hold for add-ons.   Final    Auto resulted.    Extra Tube 10/02/2024 hold for add-on   Final    Auto resulted    Fentanyl, Urine 10/02/2024 Negative  Negative Final   Admission on 09/16/2024, Discharged on 09/25/2024   Component Date Value Ref Range Status    Hepatitis B Surface Ag 09/16/2024 Non-Reactive  Non-Reactive Final    Hep A IgM 09/16/2024 Non-Reactive  Non-Reactive Final    Hep B C IgM 09/16/2024 Reactive (A)  Non-Reactive Final    Hepatitis C Ab 09/16/2024 Non-Reactive  Non-Reactive Final    QT Interval 09/16/2024 358  ms Final    QTC Interval 09/16/2024 426  ms Final    Chlamydia DNA by PCR 09/16/2024 Not Detected  Not Detected Final    Neisseria gonorrhoeae by PCR 09/16/2024 Not Detected  Not Detected Final    Urine Culture 09/16/2024 50,000 CFU/mL Escherichia coli (A)   Final        Condition on Discharge:  improved    Vital Signs  Temp:  [96.9 °F (36.1 °C)-97.3 °F (36.3 °C)] 96.9 °F (36.1 °C)  Heart Rate:  [66-84] 66  Resp:  [16] 16  BP: (115-118)/(65) 115/65      Discharge Disposition:  Home or Self Care    Discharge Medications:     Discharge Medications        Continue These Medications        Instructions Start Date   acetaminophen 325 MG tablet  Commonly known as: TYLENOL   650 mg, Oral, Every 6 Hours PRN      ARIPiprazole 5 MG  tablet  Commonly known as: ABILIFY   5 mg, Oral, Nightly      buprenorphine-naloxone 8-2 MG film film  Commonly known as: SUBOXONE   1.5 films, Sublingual, Daily      DULoxetine 30 MG capsule  Commonly known as: CYMBALTA   90 mg, Oral, Daily      hydrOXYzine 25 MG tablet  Commonly known as: ATARAX   25 mg, Oral, Every 8 Hours PRN      ibuprofen 200 MG tablet  Commonly known as: ADVIL,MOTRIN   800 mg, Oral, Every 6 Hours PRN      lisinopril 20 MG tablet  Commonly known as: PRINIVIL,ZESTRIL   20 mg, Oral, Daily      NIFEdipine XL 30 MG 24 hr tablet  Commonly known as: PROCARDIA XL   30 mg, Oral, Daily      pantoprazole 20 MG EC tablet  Commonly known as: PROTONIX   20 mg, Oral, Daily      prazosin 2 MG capsule  Commonly known as: MINIPRESS   4 mg, Oral, Nightly      rOPINIRole 0.5 MG tablet  Commonly known as: REQUIP   0.5 mg, Oral, Nightly, Take 1 hour before bedtime.      topiramate 50 MG tablet  Commonly known as: TOPAMAX   50 mg, Oral, Every 12 Hours Scheduled               Discharge Diet: Regular     Activity at Discharge: As tolerated     Follow-up Appointments    [facility]  [address] , [place] [address]  [phone]      October 17 2024 at 9:45am with Nancy.       Time: I spent  > 30  minutes on this discharge activity which included: face-to-face encounter with the patient, reviewing the data in the system, coordination of the care with the nursing staff as well as consultants, documentation, and entering orders.        Clinician:   Monica Pedroza MD  10/14/24  13:17 EDT

## 2024-10-14 NOTE — PLAN OF CARE
Goal Outcome Evaluation:  Plan of Care Reviewed With: patient  Plan of Care Reviewed With: patient  Patient Agreement with Plan of Care: agrees     Progress: improving     Pt reports poor sleep and good appetite. Pt rates anx 6/10 and dep 0/10. Pt denies SI/HI/AVH.

## 2024-10-14 NOTE — PROGRESS NOTES
Navigator is helping with the following referral:    [facility] - [phone]  -Sent 10/11  -Spoke with Intake. [facility] approved. [facility] declined.  [facility] has not made a decision. No beds in [facility] at this time.  10/14

## 2024-10-14 NOTE — PROGRESS NOTES
Behavioral Health Discharge Summary             Please fax within 24 hours of discharge to [facility] at: [phone]      Member Name: Lanny Che Member ID: [id number]   Authorization Number: [id number] Phone: [phone]   Member Address: [address] [place] [address]   Discharge Date: 10/14/2024 Level of Care at Discharge:    Facility: [facility] Staff Completing Form: Estrella SHIPMAN   If the member is being discharged directly to a residential or extended care program, please specify the type below.   __Private Child-Caring Facility (PCC) Residential/Group Home   __Private Child-Caring Facility (PCC) Therapeutic Foster Care   __Residential Treatment Facility (RTF)   __Psychiatric Residential Treatment Facility (PRTF I or II)   __Long-Term Acute Inpatient Hospital Services or Extended Care Unit (ECU)   __Other (please specify):    Brief discharge summary of treatment received (for follow up by the case management team): D/C clinical with list of medications and follow up appts given to patient upon discharge.     BRIEF SUMMARY OF RECOMMENDATIONS FOR ONGOING TREATMENT     Discharged to where: Home   Discharge diagnoses: F 33.3   Axis I:    Axis II:    Axis III:    Axis IV:    Axis V:    Does the member understand his/her DX?  Yes          Medication     Dose     Schedule Supply/  Quantity  Given at Discharge RX Provided  Yes/No  If Rx Provided, Quantity RX Prior Auth Required  Yes/No Prior Auth  Completed                                                                                             Does the member understand the reason for taking these medications? Yes                                                           FOLLOW-UP APPOINTMENTS   Please schedule within 7 days of discharge and provide appointment details for all referred services.    PCP/Other Providers Involved in Treatment:    Appointment Type: JUAN DIEGO BARR  Provider Name:   [facility]   Provider Phone: [phone] Appointment Date: 10/17/2024 Appointment Time: 9:45 AM with Nancy Chopra   (new to OP services)        Case Management    Is the member already enrolled in case management?  Yes/No  If yes, date the CM was notified:    If no, was the CM referral offered?  Yes/No  Accepted? Yes/No    Is the Release of Information in the chart? Yes/No:      Medication Management (for member discharged with psychiatric medications):      A&D Treatment (for member with substance abuse/   dependence in the past year):      Medical Condition (for member with a medical condition):    Other recommended treatment:    Do you have any concerns about the discharge plan?  No    If yes, explain:    Was the member involved in the discharge planning?  Yes    If no, explain:    Was a copy of the discharge plan provided to the member?  Yes    If no, explain:

## 2024-10-14 NOTE — PLAN OF CARE
Problem: Adult Behavioral Health Plan of Care  Goal: Plan of Care Review  Recent Flowsheet Documentation  Taken 10/14/2024 1329 by Ruthie Cuevas, RN  Patient Agreement with Plan of Care: agrees  Plan of Care Reviewed With: patient  Taken 10/14/2024 0904 by Ruthie Cuevas RN  Patient Agreement with Plan of Care: agrees  Goal: Adheres to Safety Considerations for Self and Others  Intervention: Develop and Maintain Individualized Safety Plan  Recent Flowsheet Documentation  Taken 10/14/2024 1200 by Ruthie Cuevas, RN  Safety Measures:   environmental rounds completed   safety plan reviewed   safety rounds completed  Taken 10/14/2024 1000 by Ruthie Cuevas RN  Safety Measures:   environmental rounds completed   safety plan reviewed   safety rounds completed  Taken 10/14/2024 0800 by Ruthie Cuevas RN  Safety Measures:   environmental rounds completed   safety plan reviewed   safety rounds completed  Goal: Absence of New-Onset Illness or Injury  Intervention: Identify and Manage Fall Risk  Recent Flowsheet Documentation  Taken 10/14/2024 1200 by Ruthie Cuevas RN  Safety Measures:   environmental rounds completed   safety plan reviewed   safety rounds completed  Taken 10/14/2024 1000 by Ruthie Cuevas RN  Safety Measures:   environmental rounds completed   safety plan reviewed   safety rounds completed  Taken 10/14/2024 0800 by Ruthie Cuevas RN  Safety Measures:   environmental rounds completed   safety plan reviewed   safety rounds completed  Goal: Optimized Coping Skills in Response to Life Stressors  Intervention: Promote Effective Coping Strategies  Recent Flowsheet Documentation  Taken 10/14/2024 0904 by Ruthie Cuevas, RN  Supportive Measures: active listening utilized  Goal: Develops/Participates in Therapeutic [place] to Support Successful Transition  Intervention: Foster Therapeutic [place]  Recent Flowsheet Documentation  Taken 10/14/2024 0904 by Mason  Ruthie Al, RN  Trust Relationship/Rapport: care explained   Goal Outcome Evaluation:  Plan of Care Reviewed With: patient  Plan of Care Reviewed With: patient  Patient Agreement with Plan of Care: agrees     Progress: improving  Outcome Evaluation: client asked dr. ruiz to go home today. Client states she would like to be discharged tomorrow and leave with her kids.

## 2024-10-14 NOTE — PLAN OF CARE
Goal Outcome Evaluation:         Patient is denying suicidal ideation today and denying homicidal ideation today.  Patient reports decrease in depression and decrease in anxiety today.  Patient is future oriented, is successful in identifying protective factors and reports being ready for discharge.  Patient will be transported today by family and will follow up with [facility].